# Patient Record
Sex: FEMALE | Race: OTHER | HISPANIC OR LATINO | Employment: UNEMPLOYED | URBAN - METROPOLITAN AREA
[De-identification: names, ages, dates, MRNs, and addresses within clinical notes are randomized per-mention and may not be internally consistent; named-entity substitution may affect disease eponyms.]

---

## 2021-06-16 ENCOUNTER — HOSPITAL ENCOUNTER (EMERGENCY)
Facility: HOSPITAL | Age: 36
Discharge: HOME | End: 2021-06-16
Attending: EMERGENCY MEDICINE

## 2021-06-16 VITALS
HEART RATE: 68 BPM | TEMPERATURE: 97.8 F | SYSTOLIC BLOOD PRESSURE: 140 MMHG | RESPIRATION RATE: 18 BRPM | DIASTOLIC BLOOD PRESSURE: 60 MMHG | OXYGEN SATURATION: 95 %

## 2021-06-16 DIAGNOSIS — N39.0 UTI (URINARY TRACT INFECTION): ICD-10-CM

## 2021-06-16 DIAGNOSIS — F12.188 CANNABIS HYPEREMESIS SYNDROME CONCURRENT WITH AND DUE TO CANNABIS ABUSE (HCC): ICD-10-CM

## 2021-06-16 DIAGNOSIS — R11.2 NAUSEA AND VOMITING: Primary | ICD-10-CM

## 2021-06-16 DIAGNOSIS — K52.9 GASTROENTERITIS: ICD-10-CM

## 2021-06-16 LAB
ALBUMIN SERPL BCP-MCNC: 3.9 G/DL (ref 3.5–5)
ALP SERPL-CCNC: 73 U/L (ref 46–116)
ALT SERPL W P-5'-P-CCNC: 41 U/L (ref 12–78)
ANION GAP SERPL CALCULATED.3IONS-SCNC: 11 MMOL/L (ref 4–13)
AST SERPL W P-5'-P-CCNC: 31 U/L (ref 5–45)
BACTERIA UR QL AUTO: ABNORMAL /HPF
BASOPHILS # BLD AUTO: 0.06 THOUSANDS/ΜL (ref 0–0.1)
BASOPHILS NFR BLD AUTO: 1 % (ref 0–1)
BILIRUB SERPL-MCNC: 0.44 MG/DL (ref 0.2–1)
BILIRUB UR QL STRIP: NEGATIVE
BUN SERPL-MCNC: 10 MG/DL (ref 5–25)
CALCIUM SERPL-MCNC: 9.1 MG/DL (ref 8.3–10.1)
CHLORIDE SERPL-SCNC: 106 MMOL/L (ref 100–108)
CLARITY UR: CLEAR
CO2 SERPL-SCNC: 26 MMOL/L (ref 21–32)
COLOR UR: YELLOW
CREAT SERPL-MCNC: 0.83 MG/DL (ref 0.6–1.3)
EOSINOPHIL # BLD AUTO: 0.02 THOUSAND/ΜL (ref 0–0.61)
EOSINOPHIL NFR BLD AUTO: 0 % (ref 0–6)
ERYTHROCYTE [DISTWIDTH] IN BLOOD BY AUTOMATED COUNT: 11.9 % (ref 11.6–15.1)
EXT PREG TEST URINE: NEGATIVE
EXT. CONTROL ED NAV: NORMAL
GFR SERPL CREATININE-BSD FRML MDRD: 92 ML/MIN/1.73SQ M
GLUCOSE SERPL-MCNC: 139 MG/DL (ref 65–140)
GLUCOSE UR STRIP-MCNC: NEGATIVE MG/DL
HCT VFR BLD AUTO: 42.3 % (ref 34.8–46.1)
HGB BLD-MCNC: 14.1 G/DL (ref 11.5–15.4)
HGB UR QL STRIP.AUTO: ABNORMAL
IMM GRANULOCYTES # BLD AUTO: 0.02 THOUSAND/UL (ref 0–0.2)
IMM GRANULOCYTES NFR BLD AUTO: 0 % (ref 0–2)
KETONES UR STRIP-MCNC: ABNORMAL MG/DL
LEUKOCYTE ESTERASE UR QL STRIP: ABNORMAL
LIPASE SERPL-CCNC: 76 U/L (ref 73–393)
LYMPHOCYTES # BLD AUTO: 1.06 THOUSANDS/ΜL (ref 0.6–4.47)
LYMPHOCYTES NFR BLD AUTO: 13 % (ref 14–44)
MCH RBC QN AUTO: 32.6 PG (ref 26.8–34.3)
MCHC RBC AUTO-ENTMCNC: 33.3 G/DL (ref 31.4–37.4)
MCV RBC AUTO: 98 FL (ref 82–98)
MONOCYTES # BLD AUTO: 0.22 THOUSAND/ΜL (ref 0.17–1.22)
MONOCYTES NFR BLD AUTO: 3 % (ref 4–12)
NEUTROPHILS # BLD AUTO: 6.66 THOUSANDS/ΜL (ref 1.85–7.62)
NEUTS SEG NFR BLD AUTO: 83 % (ref 43–75)
NITRITE UR QL STRIP: POSITIVE
NON-SQ EPI CELLS URNS QL MICRO: ABNORMAL /HPF
NRBC BLD AUTO-RTO: 0 /100 WBCS
PH UR STRIP.AUTO: >=9 [PH] (ref 4.5–8)
PLATELET # BLD AUTO: 322 THOUSANDS/UL (ref 149–390)
PMV BLD AUTO: 9.8 FL (ref 8.9–12.7)
POTASSIUM SERPL-SCNC: 3.6 MMOL/L (ref 3.5–5.3)
PROT SERPL-MCNC: 8.1 G/DL (ref 6.4–8.2)
PROT UR STRIP-MCNC: ABNORMAL MG/DL
RBC # BLD AUTO: 4.33 MILLION/UL (ref 3.81–5.12)
RBC #/AREA URNS AUTO: ABNORMAL /HPF
SODIUM SERPL-SCNC: 143 MMOL/L (ref 136–145)
SP GR UR STRIP.AUTO: 1.02 (ref 1–1.03)
UROBILINOGEN UR QL STRIP.AUTO: 0.2 E.U./DL
WBC # BLD AUTO: 8.04 THOUSAND/UL (ref 4.31–10.16)
WBC #/AREA URNS AUTO: ABNORMAL /HPF

## 2021-06-16 PROCEDURE — 96375 TX/PRO/DX INJ NEW DRUG ADDON: CPT

## 2021-06-16 PROCEDURE — 87086 URINE CULTURE/COLONY COUNT: CPT

## 2021-06-16 PROCEDURE — 99285 EMERGENCY DEPT VISIT HI MDM: CPT | Performed by: PHYSICIAN ASSISTANT

## 2021-06-16 PROCEDURE — 99283 EMERGENCY DEPT VISIT LOW MDM: CPT

## 2021-06-16 PROCEDURE — 83690 ASSAY OF LIPASE: CPT

## 2021-06-16 PROCEDURE — 96376 TX/PRO/DX INJ SAME DRUG ADON: CPT

## 2021-06-16 PROCEDURE — 81025 URINE PREGNANCY TEST: CPT | Performed by: PHYSICIAN ASSISTANT

## 2021-06-16 PROCEDURE — 96374 THER/PROPH/DIAG INJ IV PUSH: CPT

## 2021-06-16 PROCEDURE — 85025 COMPLETE CBC W/AUTO DIFF WBC: CPT

## 2021-06-16 PROCEDURE — 36415 COLL VENOUS BLD VENIPUNCTURE: CPT

## 2021-06-16 PROCEDURE — 87186 SC STD MICRODIL/AGAR DIL: CPT

## 2021-06-16 PROCEDURE — 96372 THER/PROPH/DIAG INJ SC/IM: CPT

## 2021-06-16 PROCEDURE — 80053 COMPREHEN METABOLIC PANEL: CPT

## 2021-06-16 PROCEDURE — 81001 URINALYSIS AUTO W/SCOPE: CPT

## 2021-06-16 PROCEDURE — 96361 HYDRATE IV INFUSION ADD-ON: CPT

## 2021-06-16 RX ORDER — HALOPERIDOL 5 MG/ML
5 INJECTION INTRAMUSCULAR ONCE
Status: COMPLETED | OUTPATIENT
Start: 2021-06-16 | End: 2021-06-16

## 2021-06-16 RX ORDER — KETOROLAC TROMETHAMINE 30 MG/ML
15 INJECTION, SOLUTION INTRAMUSCULAR; INTRAVENOUS ONCE
Status: COMPLETED | OUTPATIENT
Start: 2021-06-16 | End: 2021-06-16

## 2021-06-16 RX ORDER — ONDANSETRON 4 MG/1
4 TABLET, FILM COATED ORAL EVERY 6 HOURS
Qty: 12 TABLET | Refills: 0 | Status: SHIPPED | OUTPATIENT
Start: 2021-06-16 | End: 2022-07-28 | Stop reason: ALTCHOICE

## 2021-06-16 RX ORDER — ONDANSETRON 2 MG/ML
4 INJECTION INTRAMUSCULAR; INTRAVENOUS ONCE
Status: COMPLETED | OUTPATIENT
Start: 2021-06-16 | End: 2021-06-16

## 2021-06-16 RX ORDER — CEPHALEXIN 500 MG/1
500 CAPSULE ORAL 4 TIMES DAILY
Qty: 28 CAPSULE | Refills: 0 | Status: SHIPPED | OUTPATIENT
Start: 2021-06-16 | End: 2021-06-23

## 2021-06-16 RX ADMIN — SODIUM CHLORIDE 1000 ML: 0.9 INJECTION, SOLUTION INTRAVENOUS at 17:51

## 2021-06-16 RX ADMIN — KETOROLAC TROMETHAMINE 15 MG: 30 INJECTION, SOLUTION INTRAMUSCULAR; INTRAVENOUS at 18:42

## 2021-06-16 RX ADMIN — HALOPERIDOL LACTATE 5 MG: 5 INJECTION, SOLUTION INTRAMUSCULAR at 18:42

## 2021-06-16 RX ADMIN — ONDANSETRON 4 MG: 2 INJECTION INTRAMUSCULAR; INTRAVENOUS at 19:56

## 2021-06-16 RX ADMIN — ONDANSETRON 4 MG: 2 INJECTION INTRAMUSCULAR; INTRAVENOUS at 17:51

## 2021-06-16 NOTE — ED NOTES
Pt moving around during triage unable to sit still during blood pressure cuff reading  Unable to obtain temperature at this time        Zoë Anand, RN  06/16/21 9098

## 2021-06-16 NOTE — ED NOTES
Per pt request, pt given water and ginger ale at this time     Kaushik Sheriff, CARMELITA  06/16/21 1924

## 2021-06-16 NOTE — ED PROVIDER NOTES
History  Chief Complaint   Patient presents with    Vomiting     Pt reports vomiting since yesterday, unable to keep anything down  reports abd pain as well     Pt presents to the ED with abodminal pain and vomiting today  Pt states pain started yesterday but has vomited 20x today  + fevers and chills at home  Used marajuana yesterday and then symptoms started  Similar symptoms a month ago - seen at [facility] - feels same - had labs and CT done then - admitted for electrolyte abnormality -           None       History reviewed  No pertinent past medical history  History reviewed  No pertinent surgical history  History reviewed  No pertinent family history  I have reviewed and agree with the history as documented  E-Cigarette/Vaping    E-Cigarette Use Never User      E-Cigarette/Vaping Substances    Nicotine No     THC No     CBD No     Flavoring No     Other No     Unknown No      Social History     Tobacco Use    Smoking status: Never Smoker    Smokeless tobacco: Never Used   Vaping Use    Vaping Use: Never used   Substance Use Topics    Alcohol use: Never    Drug use: Never       Review of Systems   All other systems reviewed and are negative  Physical Exam  Physical Exam  Vitals and nursing note reviewed  Constitutional:       Appearance: She is well-developed  HENT:      Head: Normocephalic and atraumatic  Right Ear: External ear normal       Left Ear: External ear normal    Eyes:      Conjunctiva/sclera: Conjunctivae normal    Cardiovascular:      Rate and Rhythm: Normal rate and regular rhythm  Heart sounds: Normal heart sounds  Pulmonary:      Effort: Pulmonary effort is normal       Breath sounds: Normal breath sounds  Abdominal:      General: Bowel sounds are normal       Palpations: Abdomen is soft  Tenderness: There is abdominal tenderness  There is no right CVA tenderness or left CVA tenderness        Comments: Diffuse abdominal tenderness Musculoskeletal:         General: Normal range of motion  Cervical back: Normal range of motion and neck supple  Skin:     General: Skin is warm  Findings: No rash  Neurological:      Mental Status: She is alert  Psychiatric:         Behavior: Behavior normal       Comments: Anxious with symptoms and upset         Vital Signs  ED Triage Vitals   Temperature Pulse Respirations Blood Pressure SpO2   06/16/21 1923 06/16/21 1737 06/16/21 1843 06/16/21 1843 06/16/21 1737   97 8 °F (36 6 °C) 60 20 145/69 92 %      Temp Source Heart Rate Source Patient Position - Orthostatic VS BP Location FiO2 (%)   06/16/21 1923 06/16/21 1923 06/16/21 1923 06/16/21 1923 --   Oral Monitor Lying Left arm       Pain Score       06/16/21 1737       Worst Possible Pain           Vitals:    06/16/21 1737 06/16/21 1843 06/16/21 1923   BP:  145/69 140/60   Pulse: 60 78 68   Patient Position - Orthostatic VS:   Lying         Visual Acuity      ED Medications  Medications   ondansetron (ZOFRAN) injection 4 mg (4 mg Intravenous Given 6/16/21 1751)   sodium chloride 0 9 % bolus 1,000 mL (0 mL Intravenous Stopped 6/16/21 1942)   ketorolac (TORADOL) injection 15 mg (15 mg Intravenous Given 6/16/21 1842)   haloperidol lactate (HALDOL) injection 5 mg (5 mg Intramuscular Given 6/16/21 1842)   ondansetron (ZOFRAN) injection 4 mg (4 mg Intravenous Given 6/16/21 1956)       Diagnostic Studies  Results Reviewed     Procedure Component Value Units Date/Time    Urine Microscopic [[id number]]  (Abnormal) Collected: 06/16/21 1833    Lab Status: Final result Specimen: Urine, Other Updated: 06/16/21 1906     RBC, UA 2-4 /hpf      WBC, UA Innumerable /hpf      Epithelial Cells Occasional /hpf      Bacteria, UA Innumerable /hpf     Urine culture [[id number]] Collected: 06/16/21 1833    Lab Status:  In process Specimen: Urine, Other Updated: 06/16/21 1906    POCT urinalysis dipstick [[id number]]  (Abnormal) Resulted: 06/16/21 1835    Lab Status: Final result Specimen: Urine Updated: 06/16/21 1835    POCT pregnancy, urine [[id number]]  (Normal) Resulted: 06/16/21 1835    Lab Status: Final result Updated: 06/16/21 1835     EXT PREG TEST UR (Ref: Negative) negative     Control valid    Urine Macroscopic, POC [[id number]]  (Abnormal) Collected: 06/16/21 1833    Lab Status: Final result Specimen: Urine Updated: 06/16/21 1834     Color, UA Yellow     Clarity, UA Clear     pH, UA >=9 0     Leukocytes, UA Small     Nitrite, UA Positive     Protein,  (2+) mg/dl      Glucose, UA Negative mg/dl      Ketones, UA 15 (1+) mg/dl      Urobilinogen, UA 0 2 E U /dl      Bilirubin, UA Negative     Blood, UA Trace     Specific Shiro, UA 1 020    Narrative:      CLINITEK RESULT    Lipase [[id number]]  (Normal) Collected: 06/16/21 1752    Lab Status: Final result Specimen: Blood from Arm, Left Updated: 06/16/21 1810     Lipase 76 u/L     Comprehensive metabolic panel [[id number]] Collected: 06/16/21 1752    Lab Status: Final result Specimen: Blood from Arm, Left Updated: 06/16/21 1810     Sodium 143 mmol/L      Potassium 3 6 mmol/L      Chloride 106 mmol/L      CO2 26 mmol/L      ANION GAP 11 mmol/L      BUN 10 mg/dL      Creatinine 0 83 mg/dL      Glucose 139 mg/dL      Calcium 9 1 mg/dL      AST 31 U/L      ALT 41 U/L      Alkaline Phosphatase 73 U/L      Total Protein 8 1 g/dL      Albumin 3 9 g/dL      Total Bilirubin 0 44 mg/dL      eGFR 92 ml/min/1 73sq m     Narrative:      [place] guidelines for Chronic Kidney Disease (CKD):     Stage 1 with normal or high GFR (GFR > 90 mL/min/1 73 square meters)    Stage 2 Mild CKD (GFR = 60-89 mL/min/1 73 square meters)    Stage 3A Moderate CKD (GFR = 45-59 mL/min/1 73 square meters)    Stage 3B Moderate CKD (GFR = 30-44 mL/min/1 73 square meters)    Stage 4 Severe CKD (GFR = 15-29 mL/min/1 73 square meters)    Stage 5 End Stage CKD (GFR <15 mL/min/1 73 square meters)  Note: GFR calculation is accurate only with a steady state creatinine    CBC and differential [[id number]]  (Abnormal) Collected: 06/16/21 1752    Lab Status: Final result Specimen: Blood from Arm, Left Updated: 06/16/21 1756     WBC 8 04 Thousand/uL      RBC 4 33 Million/uL      Hemoglobin 14 1 g/dL      Hematocrit 42 3 %      MCV 98 fL      MCH 32 6 pg      MCHC 33 3 g/dL      RDW 11 9 %      MPV 9 8 fL      Platelets 879 Thousands/uL      nRBC 0 /100 WBCs      Neutrophils Relative 83 %      Immat GRANS % 0 %      Lymphocytes Relative 13 %      Monocytes Relative 3 %      Eosinophils Relative 0 %      Basophils Relative 1 %      Neutrophils Absolute 6 66 Thousands/µL      Immature Grans Absolute 0 02 Thousand/uL      Lymphocytes Absolute 1 06 Thousands/µL      Monocytes Absolute 0 22 Thousand/µL      Eosinophils Absolute 0 02 Thousand/µL      Basophils Absolute 0 06 Thousands/µL                  No orders to display              Procedures  Procedures         ED Course  ED Course as of Jun 16 2350   Wed Jun 16, 2021 1910 Abdomen is soft and non tender and pt resting comfortably - symptoms improved  Asking for water  Discussed labs and urine will tx UTI      1953 Doing well with PO challenge pt states she still feels nauseated but as she says this is drinking water and gingerale and no vomiting will give additional zofran prior to dc  Pt resting comfortably                                               MDM  Number of Diagnoses or Management Options  Cannabis hyperemesis syndrome concurrent with and due to cannabis abuse ([facility] [address] ): new and requires workup  Gastroenteritis: new and requires workup  Nausea and vomiting: new and requires workup  UTI (urinary tract infection): new and requires workup  Diagnosis management comments: Records from [facility] reviewed - from recent visit for similar symptoms - discussed w pt - offered CT pt declines          Amount and/or Complexity of Data Reviewed  Clinical lab tests: reviewed  Decide to obtain previous medical records or to obtain history from someone other than the patient: yes  Review and summarize past medical records: yes    Patient Progress  Patient progress: improved      Disposition  Final diagnoses:   Nausea and vomiting   Gastroenteritis   Cannabis hyperemesis syndrome concurrent with and due to cannabis abuse ([facility] )   UTI (urinary tract infection)     Time reflects when diagnosis was documented in both MDM as applicable and the Disposition within this note     Time User Action Codes Description Comment    6/16/2021  7:12 PM Antionette Lu [R11 2] Nausea and vomiting     6/16/2021  7:12 PM Antionette Lu [K52 9] Gastroenteritis     6/16/2021  7:13 PM Antionette Lu [F12 188] Cannabis hyperemesis syndrome concurrent with and due to cannabis abuse ([facility] )     6/16/2021  7:13 PM Antionette Lu [N39 0] UTI (urinary tract infection)       ED Disposition     ED Disposition Condition Date/Time Comment    Discharge Stable Wed Jun 16, 2021  7:12 PM Carola Saenz discharge to home/self care  Follow-up Information     Follow up With Specialties Details Why Contact Info    [employer]    [phone]            Discharge Medication List as of 6/16/2021  7:16 PM      START taking these medications    Details   cephalexin (KEFLEX) 500 mg capsule Take 1 capsule (500 mg total) by mouth 4 (four) times a day for 7 days, Starting Wed 6/16/2021, Until Wed 6/23/2021, Normal      ondansetron (ZOFRAN) 4 mg tablet Take 1 tablet (4 mg total) by mouth every 6 (six) hours, Starting Wed 6/16/2021, Normal           No discharge procedures on file      PDMP Review     None          ED Provider  Electronically Signed by           Alisa Schreiber PA-C  06/16/21 4255

## 2021-06-17 NOTE — ED NOTES
While medicating pt, pt dry heaving, stating "I know the doctor wants me to go home, but I don't feel good, I need to stay " RN explained to pt, that the provider prescribed her medications to help with the nausea and vomiting at home  Pt states "I just don't feel well and want to stay" Pt then rolls over and goes back to sleep  Provider aware       Annalee Tinoco RN  06/16/21 2004

## 2021-06-18 LAB — BACTERIA UR CULT: ABNORMAL

## 2021-08-09 ENCOUNTER — HOSPITAL ENCOUNTER (EMERGENCY)
Facility: HOSPITAL | Age: 36
Discharge: HOME | End: 2021-08-09
Attending: EMERGENCY MEDICINE | Admitting: EMERGENCY MEDICINE

## 2021-08-09 VITALS
HEART RATE: 91 BPM | SYSTOLIC BLOOD PRESSURE: 108 MMHG | TEMPERATURE: 98.2 F | OXYGEN SATURATION: 98 % | DIASTOLIC BLOOD PRESSURE: 76 MMHG | RESPIRATION RATE: 18 BRPM

## 2021-08-09 DIAGNOSIS — H66.92 LEFT OTITIS MEDIA: Primary | ICD-10-CM

## 2021-08-09 DIAGNOSIS — H60.502 ACUTE OTITIS EXTERNA OF LEFT EAR, UNSPECIFIED TYPE: ICD-10-CM

## 2021-08-09 PROCEDURE — 99284 EMERGENCY DEPT VISIT MOD MDM: CPT | Performed by: EMERGENCY MEDICINE

## 2021-08-09 PROCEDURE — 99282 EMERGENCY DEPT VISIT SF MDM: CPT

## 2021-08-09 RX ORDER — AMOXICILLIN 500 MG/1
500 CAPSULE ORAL 3 TIMES DAILY
Qty: 30 CAPSULE | Refills: 0 | Status: SHIPPED | OUTPATIENT
Start: 2021-08-09 | End: 2021-08-19

## 2021-08-09 RX ORDER — OFLOXACIN 3 MG/ML
10 SOLUTION AURICULAR (OTIC) DAILY
Qty: 5 ML | Refills: 0 | Status: SHIPPED | OUTPATIENT
Start: 2021-08-09 | End: 2021-08-16

## 2021-08-09 RX ORDER — AMOXICILLIN 250 MG/1
500 CAPSULE ORAL ONCE
Status: COMPLETED | OUTPATIENT
Start: 2021-08-09 | End: 2021-08-09

## 2021-08-09 RX ADMIN — AMOXICILLIN 500 MG: 250 CAPSULE ORAL at 22:02

## 2021-08-12 NOTE — ED PROVIDER NOTES
History  Chief Complaint   Patient presents with    Earache     left sided earache ongoing for a week  states having some yellow and pink drainage from the ear     This is a 80-year-old female who presents with left-sided earache and drainage for the past week  Denies any recent swimming  Patient states that she has been accidentally getting water in her years from the shower  Denies fever/chills, nausea/vomiting, URI symptoms, neck pain, chest pain, palpitations, shortness of breath, cough, neck pain, back pain, flank pain, abdominal pain, diarrhea, hematochezia, dysuria, hematuria, abnormal vaginal discharge/bleeding  Prior to Admission Medications   Prescriptions Last Dose Informant Patient Reported? Taking?   ondansetron (ZOFRAN) 4 mg tablet   No No   Sig: Take 1 tablet (4 mg total) by mouth every 6 (six) hours      Facility-Administered Medications: None       History reviewed  No pertinent past medical history  History reviewed  No pertinent surgical history  History reviewed  No pertinent family history  I have reviewed and agree with the history as documented  E-Cigarette/Vaping    E-Cigarette Use Never User      E-Cigarette/Vaping Substances    Nicotine No     THC No     CBD No     Flavoring No     Other No     Unknown No      Social History     Tobacco Use    Smoking status: Never Smoker    Smokeless tobacco: Never Used   Vaping Use    Vaping Use: Never used   Substance Use Topics    Alcohol use: Never    Drug use: Never       Review of Systems   Constitutional: Negative for chills and fever  HENT: Positive for ear discharge and ear pain  Negative for congestion, rhinorrhea, sore throat and trouble swallowing  Respiratory: Negative for cough, chest tightness, shortness of breath and wheezing  Cardiovascular: Negative for chest pain and palpitations  Gastrointestinal: Negative for abdominal pain, blood in stool, diarrhea, nausea and vomiting     Musculoskeletal: Negative for back pain and neck pain  All other systems reviewed and are negative  Physical Exam  Physical Exam  Constitutional:       Appearance: Normal appearance  She is well-developed  She is not ill-appearing or toxic-appearing  HENT:      Head: Normocephalic  Right Ear: Hearing, tympanic membrane, ear canal and external ear normal  No middle ear effusion  Left Ear: Hearing and external ear normal  Drainage present  Ears:      Comments: Yellow drainage from the left ear  Swollen and irritated ear canal   Unable to fully visualize left tympanic membrane  Mouth/Throat:      Pharynx: Uvula midline  Tonsils: No tonsillar exudate  Eyes:      General: Lids are normal       Conjunctiva/sclera: Conjunctivae normal       Pupils: Pupils are equal, round, and reactive to light  Cardiovascular:      Rate and Rhythm: Normal rate and regular rhythm  Pulmonary:      Effort: Pulmonary effort is normal  No tachypnea  Abdominal:      General: There is no distension  Palpations: Abdomen is soft  Abdomen is not rigid  Neurological:      Mental Status: She is alert  Psychiatric:         Speech: Speech normal          Behavior: Behavior normal  Behavior is cooperative           Vital Signs  ED Triage Vitals [08/09/21 2133]   Temperature Pulse Respirations Blood Pressure SpO2   98 2 °F (36 8 °C) 91 18 108/76 98 %      Temp Source Heart Rate Source Patient Position - Orthostatic VS BP Location FiO2 (%)   Oral Monitor Sitting Left arm --      Pain Score       --           Vitals:    08/09/21 2133   BP: 108/76   Pulse: 91   Patient Position - Orthostatic VS: Sitting         Visual Acuity      ED Medications  Medications   amoxicillin (AMOXIL) capsule 500 mg (500 mg Oral Given 8/9/21 2202)       Diagnostic Studies  Results Reviewed     None                 No orders to display              Procedures  Procedures         ED Course                                           MDM  Number of Diagnoses or Management Options  Acute otitis externa of left ear, unspecified type  Left otitis media  Diagnosis management comments: Will treat for otitis externa and otitis media  Follow up with family doctor  Strict return precautions given  Disposition  Final diagnoses:   Left otitis media   Acute otitis externa of left ear, unspecified type     Time reflects when diagnosis was documented in both MDM as applicable and the Disposition within this note     Time User Action Codes Description Comment    8/9/2021  9:55 PM Wing Durbin Add [Q90 07] Left otitis media     8/9/2021  9:56 PM Wing Durbin Add [H60 502] Acute otitis externa of left ear, unspecified type       ED Disposition     ED Disposition Condition Date/Time Comment    Discharge Stable Mon Aug 9, 2021  9:55 PM Angelina Griggs discharge to home/self care  Follow-up Information     Follow up With Specialties Details Why [address] [facility] Emergency Department Emergency Medicine Go to  If symptoms worsen [place] [id number]  [address] [facility] Emergency Department, [address] [facility] , [facility], [address] [facility], [address]          Discharge Medication List as of 8/9/2021  9:58 PM      START taking these medications    Details   amoxicillin (AMOXIL) 500 mg capsule Take 1 capsule (500 mg total) by mouth 3 (three) times a day for 10 days, Starting Mon 8/9/2021, Until u 8/19/2021, Normal      ofloxacin (FLOXIN) 0 3 % otic solution Administer 10 drops into the left ear daily for 7 days, Starting Mon 8/9/2021, Until Mon 8/16/2021, Normal         CONTINUE these medications which have NOT CHANGED    Details   ondansetron (ZOFRAN) 4 mg tablet Take 1 tablet (4 mg total) by mouth every 6 (six) hours, Starting Wed 6/16/2021, Normal           No discharge procedures on file      PDMP Review     None          ED Provider  Electronically Signed by Marian Robbins MD  08/12/21 9059

## 2021-10-13 ENCOUNTER — HOSPITAL ENCOUNTER (EMERGENCY)
Facility: HOSPITAL | Age: 36
Discharge: HOME | End: 2021-10-13
Payer: COMMERCIAL

## 2021-10-13 ENCOUNTER — APPOINTMENT (EMERGENCY)
Dept: ULTRASOUND IMAGING | Facility: HOSPITAL | Age: 36
End: 2021-10-13
Payer: COMMERCIAL

## 2021-10-13 VITALS
TEMPERATURE: 98.1 F | HEART RATE: 64 BPM | RESPIRATION RATE: 20 BRPM | WEIGHT: 150 LBS | SYSTOLIC BLOOD PRESSURE: 133 MMHG | OXYGEN SATURATION: 100 % | DIASTOLIC BLOOD PRESSURE: 72 MMHG | BODY MASS INDEX: 24.11 KG/M2 | HEIGHT: 66 IN

## 2021-10-13 DIAGNOSIS — R11.2 NAUSEA AND VOMITING: ICD-10-CM

## 2021-10-13 DIAGNOSIS — N39.0 UTI (URINARY TRACT INFECTION): ICD-10-CM

## 2021-10-13 DIAGNOSIS — R10.84 GENERALIZED ABDOMINAL PAIN: Primary | ICD-10-CM

## 2021-10-13 LAB
ALBUMIN SERPL BCP-MCNC: 4.4 G/DL (ref 3.4–4.8)
ALP SERPL-CCNC: 39.6 U/L (ref 35–140)
ALT SERPL W P-5'-P-CCNC: 9 U/L (ref 5–54)
ANION GAP SERPL CALCULATED.3IONS-SCNC: 9 MMOL/L (ref 4–13)
AST SERPL W P-5'-P-CCNC: 19 U/L (ref 15–41)
B-HCG SERPL-ACNC: 3560 MIU/ML
BACTERIA UR QL AUTO: ABNORMAL /HPF
BASOPHILS # BLD AUTO: 0.01 THOUSANDS/ΜL (ref 0–0.1)
BASOPHILS NFR BLD AUTO: 0 % (ref 0–1)
BILIRUB SERPL-MCNC: 0.28 MG/DL (ref 0.3–1.2)
BILIRUB UR QL STRIP: NEGATIVE
BUN SERPL-MCNC: 8 MG/DL (ref 6–20)
CALCIUM SERPL-MCNC: 9.5 MG/DL (ref 8.4–10.2)
CHLORIDE SERPL-SCNC: 107 MMOL/L (ref 96–108)
CLARITY UR: ABNORMAL
CO2 SERPL-SCNC: 27 MMOL/L (ref 22–33)
COLOR UR: YELLOW
CREAT SERPL-MCNC: 0.64 MG/DL (ref 0.4–1.1)
EOSINOPHIL # BLD AUTO: 0.07 THOUSAND/ΜL (ref 0–0.61)
EOSINOPHIL NFR BLD AUTO: 2 % (ref 0–6)
ERYTHROCYTE [DISTWIDTH] IN BLOOD BY AUTOMATED COUNT: 12.2 % (ref 11.6–15.1)
GFR SERPL CREATININE-BSD FRML MDRD: 116 ML/MIN/1.73SQ M
GLUCOSE SERPL-MCNC: 103 MG/DL (ref 65–140)
GLUCOSE UR STRIP-MCNC: NEGATIVE MG/DL
HCT VFR BLD AUTO: 41.6 % (ref 34.8–46.1)
HGB BLD-MCNC: 14.2 G/DL (ref 11.5–15.4)
HGB UR QL STRIP.AUTO: ABNORMAL
IMM GRANULOCYTES # BLD AUTO: 0 THOUSAND/UL (ref 0–0.2)
IMM GRANULOCYTES NFR BLD AUTO: 0 % (ref 0–2)
KETONES UR STRIP-MCNC: ABNORMAL MG/DL
LACTATE SERPL-SCNC: 1.3 MMOL/L (ref 0–2)
LEUKOCYTE ESTERASE UR QL STRIP: ABNORMAL
LIPASE SERPL-CCNC: 63 U/L (ref 13–60)
LYMPHOCYTES # BLD AUTO: 1.76 THOUSANDS/ΜL (ref 0.6–4.47)
LYMPHOCYTES NFR BLD AUTO: 37 % (ref 14–44)
MCH RBC QN AUTO: 31.9 PG (ref 26.8–34.3)
MCHC RBC AUTO-ENTMCNC: 34.1 G/DL (ref 31.4–37.4)
MCV RBC AUTO: 94 FL (ref 82–98)
MONOCYTES # BLD AUTO: 0.46 THOUSAND/ΜL (ref 0.17–1.22)
MONOCYTES NFR BLD AUTO: 10 % (ref 4–12)
NEUTROPHILS # BLD AUTO: 2.52 THOUSANDS/ΜL (ref 1.85–7.62)
NEUTS SEG NFR BLD AUTO: 51 % (ref 43–75)
NITRITE UR QL STRIP: POSITIVE
NON-SQ EPI CELLS URNS QL MICRO: ABNORMAL /HPF
PH UR STRIP.AUTO: 6 [PH]
PLATELET # BLD AUTO: 236 THOUSANDS/UL (ref 149–390)
PMV BLD AUTO: 10.4 FL (ref 8.9–12.7)
POTASSIUM SERPL-SCNC: 4.6 MMOL/L (ref 3.5–5)
PROT SERPL-MCNC: 7.8 G/DL (ref 6.4–8.3)
PROT UR STRIP-MCNC: NEGATIVE MG/DL
RBC # BLD AUTO: 4.45 MILLION/UL (ref 3.81–5.12)
RBC #/AREA URNS AUTO: ABNORMAL /HPF
SODIUM SERPL-SCNC: 143 MMOL/L (ref 133–145)
SP GR UR STRIP.AUTO: 1.02 (ref 1–1.03)
UROBILINOGEN UR QL STRIP.AUTO: 0.2 E.U./DL
WBC # BLD AUTO: 4.82 THOUSAND/UL (ref 4.31–10.16)
WBC #/AREA URNS AUTO: ABNORMAL /HPF

## 2021-10-13 PROCEDURE — 80053 COMPREHEN METABOLIC PANEL: CPT | Performed by: PHYSICIAN ASSISTANT

## 2021-10-13 PROCEDURE — 99284 EMERGENCY DEPT VISIT MOD MDM: CPT

## 2021-10-13 PROCEDURE — 81001 URINALYSIS AUTO W/SCOPE: CPT | Performed by: PHYSICIAN ASSISTANT

## 2021-10-13 PROCEDURE — 96375 TX/PRO/DX INJ NEW DRUG ADDON: CPT

## 2021-10-13 PROCEDURE — 83690 ASSAY OF LIPASE: CPT | Performed by: PHYSICIAN ASSISTANT

## 2021-10-13 PROCEDURE — 87077 CULTURE AEROBIC IDENTIFY: CPT | Performed by: PHYSICIAN ASSISTANT

## 2021-10-13 PROCEDURE — 87086 URINE CULTURE/COLONY COUNT: CPT | Performed by: PHYSICIAN ASSISTANT

## 2021-10-13 PROCEDURE — 84702 CHORIONIC GONADOTROPIN TEST: CPT | Performed by: PHYSICIAN ASSISTANT

## 2021-10-13 PROCEDURE — 96365 THER/PROPH/DIAG IV INF INIT: CPT

## 2021-10-13 PROCEDURE — 96361 HYDRATE IV INFUSION ADD-ON: CPT

## 2021-10-13 PROCEDURE — 36415 COLL VENOUS BLD VENIPUNCTURE: CPT | Performed by: PHYSICIAN ASSISTANT

## 2021-10-13 PROCEDURE — 96376 TX/PRO/DX INJ SAME DRUG ADON: CPT

## 2021-10-13 PROCEDURE — 85025 COMPLETE CBC W/AUTO DIFF WBC: CPT | Performed by: PHYSICIAN ASSISTANT

## 2021-10-13 PROCEDURE — 87186 SC STD MICRODIL/AGAR DIL: CPT | Performed by: PHYSICIAN ASSISTANT

## 2021-10-13 PROCEDURE — 83605 ASSAY OF LACTIC ACID: CPT | Performed by: PHYSICIAN ASSISTANT

## 2021-10-13 PROCEDURE — 99284 EMERGENCY DEPT VISIT MOD MDM: CPT | Performed by: PHYSICIAN ASSISTANT

## 2021-10-13 PROCEDURE — 96372 THER/PROPH/DIAG INJ SC/IM: CPT

## 2021-10-13 PROCEDURE — 76815 OB US LIMITED FETUS(S): CPT

## 2021-10-13 RX ORDER — CEFTRIAXONE 1 G/50ML
1000 INJECTION, SOLUTION INTRAVENOUS ONCE
Status: COMPLETED | OUTPATIENT
Start: 2021-10-13 | End: 2021-10-13

## 2021-10-13 RX ORDER — ONDANSETRON 4 MG/1
4 TABLET, ORALLY DISINTEGRATING ORAL EVERY 6 HOURS PRN
Qty: 12 TABLET | Refills: 0 | Status: SHIPPED | OUTPATIENT
Start: 2021-10-13 | End: 2022-07-28 | Stop reason: ALTCHOICE

## 2021-10-13 RX ORDER — HALOPERIDOL 5 MG/ML
2 INJECTION INTRAMUSCULAR ONCE
Status: DISCONTINUED | OUTPATIENT
Start: 2021-10-13 | End: 2021-10-13

## 2021-10-13 RX ORDER — HALOPERIDOL 5 MG/ML
2 INJECTION INTRAMUSCULAR ONCE
Status: COMPLETED | OUTPATIENT
Start: 2021-10-13 | End: 2021-10-13

## 2021-10-13 RX ORDER — ONDANSETRON 2 MG/ML
4 INJECTION INTRAMUSCULAR; INTRAVENOUS ONCE
Status: COMPLETED | OUTPATIENT
Start: 2021-10-13 | End: 2021-10-13

## 2021-10-13 RX ORDER — KETOROLAC TROMETHAMINE 30 MG/ML
15 INJECTION, SOLUTION INTRAMUSCULAR; INTRAVENOUS ONCE
Status: COMPLETED | OUTPATIENT
Start: 2021-10-13 | End: 2021-10-13

## 2021-10-13 RX ORDER — ACETAMINOPHEN 325 MG/1
650 TABLET ORAL ONCE
Status: DISCONTINUED | OUTPATIENT
Start: 2021-10-13 | End: 2021-10-13 | Stop reason: HOSPADM

## 2021-10-13 RX ORDER — CEPHALEXIN 500 MG/1
500 CAPSULE ORAL 3 TIMES DAILY
Qty: 21 CAPSULE | Refills: 0 | Status: SHIPPED | OUTPATIENT
Start: 2021-10-13 | End: 2021-10-20

## 2021-10-13 RX ADMIN — KETOROLAC TROMETHAMINE 15 MG: 30 INJECTION, SOLUTION INTRAMUSCULAR at 15:47

## 2021-10-13 RX ADMIN — ONDANSETRON 4 MG: 2 INJECTION INTRAMUSCULAR; INTRAVENOUS at 16:20

## 2021-10-13 RX ADMIN — CEFTRIAXONE 1000 MG: 1 INJECTION, SOLUTION INTRAVENOUS at 15:14

## 2021-10-13 RX ADMIN — HALOPERIDOL LACTATE 2 MG: 5 INJECTION, SOLUTION INTRAMUSCULAR at 15:50

## 2021-10-13 RX ADMIN — SODIUM CHLORIDE 1000 ML: 0.9 INJECTION, SOLUTION INTRAVENOUS at 14:21

## 2021-10-13 RX ADMIN — ONDANSETRON 4 MG: 2 INJECTION INTRAMUSCULAR; INTRAVENOUS at 14:20

## 2021-10-16 LAB — BACTERIA UR CULT: ABNORMAL

## 2022-07-28 ENCOUNTER — APPOINTMENT (EMERGENCY)
Dept: ULTRASOUND IMAGING | Facility: HOSPITAL | Age: 37
End: 2022-07-28
Payer: COMMERCIAL

## 2022-07-28 ENCOUNTER — HOSPITAL ENCOUNTER (EMERGENCY)
Facility: HOSPITAL | Age: 37
Discharge: HOME | End: 2022-07-28
Attending: EMERGENCY MEDICINE
Payer: COMMERCIAL

## 2022-07-28 ENCOUNTER — APPOINTMENT (EMERGENCY)
Dept: CT IMAGING | Facility: HOSPITAL | Age: 37
End: 2022-07-28
Payer: COMMERCIAL

## 2022-07-28 VITALS
OXYGEN SATURATION: 98 % | SYSTOLIC BLOOD PRESSURE: 122 MMHG | RESPIRATION RATE: 16 BRPM | HEART RATE: 68 BPM | DIASTOLIC BLOOD PRESSURE: 74 MMHG | TEMPERATURE: 97.1 F

## 2022-07-28 DIAGNOSIS — R11.2 NAUSEA AND VOMITING: ICD-10-CM

## 2022-07-28 DIAGNOSIS — K60.3 PERIANAL FISTULA: Primary | ICD-10-CM

## 2022-07-28 DIAGNOSIS — K62.89 RECTAL PAIN: ICD-10-CM

## 2022-07-28 LAB
ALBUMIN SERPL BCP-MCNC: 3.7 G/DL (ref 3.5–5)
ALP SERPL-CCNC: 55 U/L (ref 46–116)
ALT SERPL W P-5'-P-CCNC: 14 U/L (ref 12–78)
ANION GAP SERPL CALCULATED.3IONS-SCNC: 6 MMOL/L (ref 4–13)
AST SERPL W P-5'-P-CCNC: 14 U/L (ref 5–45)
BACTERIA UR QL AUTO: ABNORMAL /HPF
BASOPHILS # BLD AUTO: 0.05 THOUSANDS/ΜL (ref 0–0.1)
BASOPHILS NFR BLD AUTO: 1 % (ref 0–1)
BILIRUB SERPL-MCNC: 0.5 MG/DL (ref 0.2–1)
BILIRUB UR QL STRIP: NEGATIVE
BUN SERPL-MCNC: 8 MG/DL (ref 5–25)
CALCIUM SERPL-MCNC: 8.8 MG/DL (ref 8.3–10.1)
CHLORIDE SERPL-SCNC: 102 MMOL/L (ref 96–108)
CLARITY UR: ABNORMAL
CO2 SERPL-SCNC: 30 MMOL/L (ref 21–32)
COLOR UR: ABNORMAL
CREAT SERPL-MCNC: 0.8 MG/DL (ref 0.6–1.3)
EOSINOPHIL # BLD AUTO: 0.17 THOUSAND/ΜL (ref 0–0.61)
EOSINOPHIL NFR BLD AUTO: 2 % (ref 0–6)
ERYTHROCYTE [DISTWIDTH] IN BLOOD BY AUTOMATED COUNT: 11.9 % (ref 11.6–15.1)
EXT PREG TEST URINE: NORMAL
EXT. CONTROL ED NAV: NORMAL
GFR SERPL CREATININE-BSD FRML MDRD: 95 ML/MIN/1.73SQ M
GLUCOSE SERPL-MCNC: 98 MG/DL (ref 65–140)
GLUCOSE UR STRIP-MCNC: NEGATIVE MG/DL
HCT VFR BLD AUTO: 45 % (ref 34.8–46.1)
HGB BLD-MCNC: 14.9 G/DL (ref 11.5–15.4)
HGB UR QL STRIP.AUTO: NEGATIVE
IMM GRANULOCYTES # BLD AUTO: 0.03 THOUSAND/UL (ref 0–0.2)
IMM GRANULOCYTES NFR BLD AUTO: 0 % (ref 0–2)
KETONES UR STRIP-MCNC: ABNORMAL MG/DL
LEUKOCYTE ESTERASE UR QL STRIP: NEGATIVE
LIPASE SERPL-CCNC: 114 U/L (ref 73–393)
LYMPHOCYTES # BLD AUTO: 1.97 THOUSANDS/ΜL (ref 0.6–4.47)
LYMPHOCYTES NFR BLD AUTO: 19 % (ref 14–44)
MCH RBC QN AUTO: 31.8 PG (ref 26.8–34.3)
MCHC RBC AUTO-ENTMCNC: 33.1 G/DL (ref 31.4–37.4)
MCV RBC AUTO: 96 FL (ref 82–98)
MONOCYTES # BLD AUTO: 0.45 THOUSAND/ΜL (ref 0.17–1.22)
MONOCYTES NFR BLD AUTO: 4 % (ref 4–12)
MUCOUS THREADS UR QL AUTO: ABNORMAL
NEUTROPHILS # BLD AUTO: 7.5 THOUSANDS/ΜL (ref 1.85–7.62)
NEUTS SEG NFR BLD AUTO: 74 % (ref 43–75)
NITRITE UR QL STRIP: NEGATIVE
NON-SQ EPI CELLS URNS QL MICRO: ABNORMAL /HPF
NRBC BLD AUTO-RTO: 0 /100 WBCS
PH UR STRIP.AUTO: 7 [PH] (ref 4.5–8)
PLATELET # BLD AUTO: 276 THOUSANDS/UL (ref 149–390)
PMV BLD AUTO: 9 FL (ref 8.9–12.7)
POTASSIUM SERPL-SCNC: 4 MMOL/L (ref 3.5–5.3)
PROT SERPL-MCNC: 7.8 G/DL (ref 6.4–8.4)
PROT UR STRIP-MCNC: ABNORMAL MG/DL
RBC # BLD AUTO: 4.69 MILLION/UL (ref 3.81–5.12)
RBC #/AREA URNS AUTO: ABNORMAL /HPF
SODIUM SERPL-SCNC: 138 MMOL/L (ref 135–147)
SP GR UR STRIP.AUTO: 1.02 (ref 1–1.03)
UROBILINOGEN UR QL STRIP.AUTO: 0.2 E.U./DL
WBC # BLD AUTO: 10.17 THOUSAND/UL (ref 4.31–10.16)
WBC #/AREA URNS AUTO: ABNORMAL /HPF

## 2022-07-28 PROCEDURE — 81001 URINALYSIS AUTO W/SCOPE: CPT

## 2022-07-28 PROCEDURE — 99284 EMERGENCY DEPT VISIT MOD MDM: CPT

## 2022-07-28 PROCEDURE — 36415 COLL VENOUS BLD VENIPUNCTURE: CPT | Performed by: PHYSICIAN ASSISTANT

## 2022-07-28 PROCEDURE — 96365 THER/PROPH/DIAG IV INF INIT: CPT

## 2022-07-28 PROCEDURE — G1004 CDSM NDSC: HCPCS

## 2022-07-28 PROCEDURE — 96376 TX/PRO/DX INJ SAME DRUG ADON: CPT

## 2022-07-28 PROCEDURE — 96361 HYDRATE IV INFUSION ADD-ON: CPT

## 2022-07-28 PROCEDURE — 83690 ASSAY OF LIPASE: CPT | Performed by: PHYSICIAN ASSISTANT

## 2022-07-28 PROCEDURE — 72193 CT PELVIS W/DYE: CPT

## 2022-07-28 PROCEDURE — 82272 OCCULT BLD FECES 1-3 TESTS: CPT

## 2022-07-28 PROCEDURE — 80053 COMPREHEN METABOLIC PANEL: CPT | Performed by: PHYSICIAN ASSISTANT

## 2022-07-28 PROCEDURE — 81025 URINE PREGNANCY TEST: CPT | Performed by: PHYSICIAN ASSISTANT

## 2022-07-28 PROCEDURE — 85025 COMPLETE CBC W/AUTO DIFF WBC: CPT | Performed by: PHYSICIAN ASSISTANT

## 2022-07-28 PROCEDURE — 96375 TX/PRO/DX INJ NEW DRUG ADDON: CPT

## 2022-07-28 PROCEDURE — 76882 US LMTD JT/FCL EVL NVASC XTR: CPT

## 2022-07-28 PROCEDURE — 99285 EMERGENCY DEPT VISIT HI MDM: CPT | Performed by: PHYSICIAN ASSISTANT

## 2022-07-28 RX ORDER — SULFAMETHOXAZOLE AND TRIMETHOPRIM 800; 160 MG/1; MG/1
1 TABLET ORAL 2 TIMES DAILY
Qty: 14 TABLET | Refills: 0 | Status: SHIPPED | OUTPATIENT
Start: 2022-07-28 | End: 2022-08-04

## 2022-07-28 RX ORDER — KETOROLAC TROMETHAMINE 30 MG/ML
15 INJECTION, SOLUTION INTRAMUSCULAR; INTRAVENOUS ONCE
Status: COMPLETED | OUTPATIENT
Start: 2022-07-28 | End: 2022-07-28

## 2022-07-28 RX ORDER — IBUPROFEN 600 MG/1
600 TABLET ORAL EVERY 6 HOURS PRN
Qty: 30 TABLET | Refills: 0 | Status: SHIPPED | OUTPATIENT
Start: 2022-07-28

## 2022-07-28 RX ORDER — METRONIDAZOLE 500 MG/1
500 TABLET ORAL ONCE
Status: COMPLETED | OUTPATIENT
Start: 2022-07-28 | End: 2022-07-28

## 2022-07-28 RX ORDER — LIDOCAINE 5 G/100G
CREAM RECTAL; TOPICAL 3 TIMES DAILY PRN
Qty: 113 G | Refills: 0 | Status: SHIPPED | OUTPATIENT
Start: 2022-07-28

## 2022-07-28 RX ORDER — ONDANSETRON 4 MG/1
4 TABLET, ORALLY DISINTEGRATING ORAL EVERY 6 HOURS PRN
Qty: 20 TABLET | Refills: 0 | Status: SHIPPED | OUTPATIENT
Start: 2022-07-28

## 2022-07-28 RX ORDER — ONDANSETRON 2 MG/ML
4 INJECTION INTRAMUSCULAR; INTRAVENOUS ONCE
Status: COMPLETED | OUTPATIENT
Start: 2022-07-28 | End: 2022-07-28

## 2022-07-28 RX ADMIN — ONDANSETRON 4 MG: 2 INJECTION INTRAMUSCULAR; INTRAVENOUS at 14:28

## 2022-07-28 RX ADMIN — KETOROLAC TROMETHAMINE 15 MG: 30 INJECTION, SOLUTION INTRAMUSCULAR; INTRAVENOUS at 15:23

## 2022-07-28 RX ADMIN — MORPHINE SULFATE 2 MG: 2 INJECTION, SOLUTION INTRAMUSCULAR; INTRAVENOUS at 20:24

## 2022-07-28 RX ADMIN — CEFTRIAXONE SODIUM 1000 MG: 10 INJECTION, POWDER, FOR SOLUTION INTRAVENOUS at 19:06

## 2022-07-28 RX ADMIN — IOHEXOL 70 ML: 350 INJECTION, SOLUTION INTRAVENOUS at 15:21

## 2022-07-28 RX ADMIN — SODIUM CHLORIDE 1000 ML: 9 INJECTION, SOLUTION INTRAVENOUS at 14:26

## 2022-07-28 RX ADMIN — METRONIDAZOLE 500 MG: 500 TABLET ORAL at 19:06

## 2022-07-28 RX ADMIN — MORPHINE SULFATE 2 MG: 2 INJECTION, SOLUTION INTRAMUSCULAR; INTRAVENOUS at 17:09

## 2022-07-28 NOTE — ED NOTES
Pt aware that urine sample is needed before pain medication can be administered  Will ring when able to provide sample        Marco Antonio Frias RN  07/28/22 5580

## 2022-07-28 NOTE — ED PROVIDER NOTES
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 who presents with perirectal pain over the last 5 days  Patient reports that she had multiple surgeries for her perirectal abscess  She initially had 1 in [place] and then had to here in the [place]  She was post to have a 3rd surgery, but did not end up following up secondary to Vishnu  Patient states she had been doing well, over the last few days she has noted significantly increasing pain at the site of her prior incision and around her rectum  Patient states it feels like the prior time she had a perirectal abscess  This morning she did have a bowel movement, but noted significant pain with bowel movement and a few streaks of blood on the stool  Patient denies any known drainage from the area or from the rectum  She has not taken anything to help alleviate the pain  The pain makes it nearly impossible for her to sit  Patient reports that she woke up this morning not feeling well with body aches, diaphoresis and nausea and 1 episode of nonbloody, nonbilious vomiting  She denies any associated abdominal pain, repeat episodes of vomiting, no fever, chills  Patient states she is otherwise in her usual state of health and denies any headache, dizziness, lightheadedness, congestion, cough, shortness of breath, chest pain, palpitations, recent travel, known sick contacts, new foods or medications  None       History reviewed  No pertinent past medical history  History reviewed  No pertinent surgical history  History reviewed  No pertinent family history  I have reviewed and agree with the history as documented      E-Cigarette/Vaping    E-Cigarette Use Never User E-Cigarette/Vaping Substances    Nicotine No     THC No     CBD No     Flavoring No     Other No     Unknown No      Social History     Tobacco Use    Smoking status: Current Every Day Smoker     Packs/day: 0 25     Types: Cigarettes    Smokeless tobacco: Never Used   Vaping Use    Vaping Use: Never used   Substance Use Topics    Alcohol use: Not Currently    Drug use: Yes     Types: Marijuana       Review of Systems   Constitutional: Positive for diaphoresis  Negative for chills and fever  HENT: Negative for congestion  Eyes: Negative for visual disturbance  Respiratory: Negative for cough, shortness of breath, wheezing and stridor  Cardiovascular: Negative for chest pain, palpitations and leg swelling  Gastrointestinal: Positive for blood in stool, nausea, rectal pain and vomiting  Negative for abdominal pain, anal bleeding, constipation and diarrhea  Genitourinary: Negative for difficulty urinating, dysuria, frequency, hematuria and urgency  Musculoskeletal: Positive for myalgias  Negative for neck pain and neck stiffness  Skin: Negative for color change, pallor and rash  Neurological: Negative for dizziness, weakness, light-headedness, numbness and headaches  All other systems reviewed and are negative  Physical Exam  Physical Exam  Vitals and nursing note reviewed  Exam conducted with a chaperone present (PA student)  Constitutional:       General: She is awake  She is not in acute distress  Appearance: She is well-developed  She is not ill-appearing, toxic-appearing or diaphoretic  HENT:      Head: Normocephalic and atraumatic  Right Ear: External ear normal       Left Ear: External ear normal       Nose: Nose normal    Eyes:      Conjunctiva/sclera: Conjunctivae normal       Pupils: Pupils are equal, round, and reactive to light  Cardiovascular:      Rate and Rhythm: Normal rate and regular rhythm  Pulses: Normal pulses        Heart sounds: Normal heart sounds  Pulmonary:      Effort: Pulmonary effort is normal  No respiratory distress  Breath sounds: Normal breath sounds  No stridor  No decreased breath sounds or wheezing  Abdominal:      General: Bowel sounds are normal  There is no distension  Palpations: Abdomen is soft  Tenderness: There is no abdominal tenderness  There is no right CVA tenderness, left CVA tenderness, guarding or rebound  Genitourinary:     Rectum: Guaiac result negative  Tenderness present  No external hemorrhoid  Normal anal tone  Comments: Patient with old surgical scar at 9 o'clock position immediately adjacent to the rectum  Significant tenderness to palpation with small area of swelling and erythema on the edge of the rectum  Also TTP  Some clear/ slightly discolored fluid noted per rectum  Attempted rectal exam, but patient unable to tolerate due to pain  Guaiac completed with brown stool noted, guaiac negative  Musculoskeletal:         General: Normal range of motion  Cervical back: Normal range of motion and neck supple  Skin:     General: Skin is warm and dry  Capillary Refill: Capillary refill takes less than 2 seconds  Neurological:      Mental Status: She is alert and oriented to person, place, and time  Psychiatric:         Behavior: Behavior is cooperative           Vital Signs  ED Triage Vitals   Temperature Pulse Respirations Blood Pressure SpO2   07/28/22 1305 07/28/22 1305 07/28/22 1305 07/28/22 1305 07/28/22 1307   (!) 97 1 °F (36 2 °C) 86 16 115/79 98 %      Temp Source Heart Rate Source Patient Position - Orthostatic VS BP Location FiO2 (%)   07/28/22 1305 07/28/22 1305 07/28/22 1305 07/28/22 1305 --   Oral Monitor Sitting Right arm       Pain Score       07/28/22 1709       10 - Worst Possible Pain           Vitals:    07/28/22 1549 07/28/22 1807 07/28/22 2021 07/28/22 2100   BP: 108/70 111/65 91/52 122/74   Pulse: 77 73 68 68   Patient Position - Orthostatic VS: Lying Lying Lying          Visual Acuity      ED Medications  Medications   sodium chloride 0 9 % bolus 1,000 mL (0 mL Intravenous Stopped 7/28/22 1842)   ondansetron (ZOFRAN) injection 4 mg (4 mg Intravenous Given 7/28/22 1428)   ketorolac (TORADOL) injection 15 mg (15 mg Intravenous Given 7/28/22 1523)   iohexol (OMNIPAQUE) 350 MG/ML injection (MULTI-DOSE) 70 mL (70 mL Intravenous Given 7/28/22 1521)   morphine injection 2 mg (2 mg Intravenous Given 7/28/22 1709)   ceftriaxone (ROCEPHIN) 1 g/50 mL in dextrose IVPB (0 mg Intravenous Stopped 7/28/22 1936)   metroNIDAZOLE (FLAGYL) tablet 500 mg (500 mg Oral Given 7/28/22 1906)   morphine injection 2 mg (2 mg Intravenous Given 7/28/22 2024)       Diagnostic Studies  Results Reviewed     Procedure Component Value Units Date/Time    Urine Microscopic [[id number]]  (Abnormal) Collected: 07/28/22 1455    Lab Status: Final result Specimen: Urine, Clean Catch Updated: 07/28/22 1516     RBC, UA None Seen /hpf      WBC, UA 1-2 /hpf      Epithelial Cells Moderate /hpf      Bacteria, UA Innumerable /hpf      MUCUS THREADS Occasional    Comprehensive metabolic panel [[id number]] Collected: 07/28/22 1429    Lab Status: Final result Specimen: Blood from Arm, Left Updated: 07/28/22 1500     Sodium 138 mmol/L      Potassium 4 0 mmol/L      Chloride 102 mmol/L      CO2 30 mmol/L      ANION GAP 6 mmol/L      BUN 8 mg/dL      Creatinine 0 80 mg/dL      Glucose 98 mg/dL      Calcium 8 8 mg/dL      AST 14 U/L      ALT 14 U/L      Alkaline Phosphatase 55 U/L      Total Protein 7 8 g/dL      Albumin 3 7 g/dL      Total Bilirubin 0 50 mg/dL      eGFR 95 ml/min/1 73sq m     Narrative:      [place] guidelines for Chronic Kidney Disease (CKD):     Stage 1 with normal or high GFR (GFR > 90 mL/min/1 73 square meters)    Stage 2 Mild CKD (GFR = 60-89 mL/min/1 73 square meters)    Stage 3A Moderate CKD (GFR = 45-59 mL/min/1 73 square meters)    Stage 3B Moderate CKD (GFR = 30-44 mL/min/1 73 square meters)    Stage 4 Severe CKD (GFR = 15-29 mL/min/1 73 square meters)    Stage 5 End Stage CKD (GFR <15 mL/min/1 73 square meters)  Note: GFR calculation is accurate only with a steady state creatinine    Lipase [[id number]]  (Normal) Collected: 07/28/22 1429    Lab Status: Final result Specimen: Blood from Arm, Left Updated: 07/28/22 1500     Lipase 114 u/L     POCT pregnancy, urine [[id number]]  (Normal) Resulted: 07/28/22 1457    Lab Status: Final result Updated: 07/28/22 1457     EXT PREG TEST UR (Ref: Negative) neg(-)     Control valid    Urine Macroscopic, POC [[id number]]  (Abnormal) Collected: 07/28/22 1455    Lab Status: Final result Specimen: Urine Updated: 07/28/22 1456     Color, UA Brown     Clarity, UA Cloudy     pH, UA 7 0     Leukocytes, UA Negative     Nitrite, UA Negative     Protein, UA 30 (1+) mg/dl      Glucose, UA Negative mg/dl      Ketones, UA Trace mg/dl      Urobilinogen, UA 0 2 E U /dl      Bilirubin, UA Negative     Occult Blood, UA Negative     Specific Gravity, UA 1 020    Narrative:      CLINITEK RESULT    CBC and differential [[id number]]  (Abnormal) Collected: 07/28/22 1429    Lab Status: Final result Specimen: Blood from Arm, Left Updated: 07/28/22 1434     WBC 10 17 Thousand/uL      RBC 4 69 Million/uL      Hemoglobin 14 9 g/dL      Hematocrit 45 0 %      MCV 96 fL      MCH 31 8 pg      MCHC 33 1 g/dL      RDW 11 9 %      MPV 9 0 fL      Platelets 085 Thousands/uL      nRBC 0 /100 WBCs      Neutrophils Relative 74 %      Immat GRANS % 0 %      Lymphocytes Relative 19 %      Monocytes Relative 4 %      Eosinophils Relative 2 %      Basophils Relative 1 %      Neutrophils Absolute 7 50 Thousands/µL      Immature Grans Absolute 0 03 Thousand/uL      Lymphocytes Absolute 1 97 Thousands/µL      Monocytes Absolute 0 45 Thousand/µL      Eosinophils Absolute 0 17 Thousand/µL      Basophils Absolute 0 05 Thousands/µL                  US extremity soft tissue   Final Result by Lena Polanco MD (07/28 1952)      Tiny fluid collection within an area of perianal phlegmon extending to the skin surface at location of reported drainage, compatible with a seroma or abscess with the presumed skin fistula not visualized  The study was marked in EPIC for significant notification  Workstation performed: [id number]         CT pelvis w contrast   Final Result by Lena Polanco MD (07/28 1554)      Presumed intersphincteric perianal fistula extends into the subcutaneous soft tissues of the left buttock and skin of the intergluteal fold the sigmoid with associated inflammatory changes but no visualized discrete abscess or fistula  Consider MRI for    evaluation of the precise relationship to the anal sphincter complex  The study was marked in EPIC for significant notification  Workstation performed: [id number]                    Procedures  Procedures         ED Course  ED Course as of 07/28/22 2157   Thu Jul 28, 2022   1601 CT pelvis w contrast  IMPRESSION:     Presumed intersphincteric perianal fistula extends into the subcutaneous soft tissues of the left buttock and skin of the intergluteal fold the sigmoid with associated inflammatory changes but no visualized discrete abscess or fistula  Consider MRI for   evaluation of the precise relationship to the anal sphincter complex  1630 Discussed case with Surgery resident  No drainable abscess, so nothing for general surgery to do  Patient should be evaluated by colorectal   1651 This with patient, answered questions  Patient states the pain is returning, will give more pain medication  Discussed with patient she needs colorectal evaluation given that she has had multiple surgeries at [facility], discussed transfer to [facility] for colorectal evaluation  Patient is refusing to go to [facility] stating she does not want further care at that location    Will discuss with surgery here to talk to colorectal at [facility]  1954  extremity soft tissue  IMPRESSION:     Tiny fluid collection within an area of perianal phlegmon extending to the skin surface at location of reported drainage, compatible with a seroma or abscess with the presumed skin fistula not visualized  2001 Paged surgery with findings   2040 I personally spoke with Colorectal, Dr Erick Dewitt, reviewed case and ED course  He also reviewed images  He believes that findings are consistent with chronic fistula with no acute abscess or infection noted  Can sent home with course of Bactrim to cover for infection  He reports that patient would not require any inpatient workup at this time  He will happily see in the office, can place referral    2105 Extensive discussion with patient regarding findings, conversation with colorectal, treatment plan  Patient is upset stating that she wants everything fixed right now  Again reviewed discussion with colorectal with patient that there is nothing emergent that they would do  Patient states she understands, but is frustrated that the specialist did not come to bedside to see her  Discussed with patient and provided education  Patient is agreeable to plan and is requesting to go home  Patient stable for discharge  SBIRT 20yo+    Flowsheet Row Most Recent Value   SBIRT (25 yo +)    In order to provide better care to our patients, we are screening all of our patients for alcohol and drug use  Would it be okay to ask you these screening questions? No Filed at: 07/28/2022 1429                    MDM  Number of Diagnoses or Management Options  Nausea and vomiting  Perianal fistula  Rectal pain  Diagnosis management comments: See ED course for full discussion regarding patient's symptoms and CT findings  Patient did note improvement of pain    Reviewed medication education, treatment at home, discussion with colorectal   Reviewed all results with patient, answered questions  Recommended follow-up with colorectal as soon as possible for further evaluation of symptoms  Recommended follow-up with PCP for monitoring of symptoms  The management plan was discussed in detail with the patient at bedside and all questions were answered  Provided both verbal and written instructions  Reviewed red flag symptoms and strict return to ED instructions  Patient notes understanding and agrees to plan  Disposition  Final diagnoses:   Perianal fistula   Rectal pain   Nausea and vomiting     Time reflects when diagnosis was documented in both MDM as applicable and the Disposition within this note     Time User Action Codes Description Comment    7/28/2022  9:06 PM Bertis Rides Add [K60 3] Perianal fistula     7/28/2022  9:06 PM Bertis Rides Add [K62 89] Rectal pain     7/28/2022  9:06 PM [place], [address] [R11 2] Nausea and vomiting       ED Disposition     ED Disposition   Discharge    Condition   Stable    Date/Time   Thu Jul 28, 2022  9:06 PM    Comment   Gillian Ferraro discharge to home/self care                 Follow-up Information     Follow up With Specialties Details Why Contact Info Additional Information    [address] [facility] Emergency Department Emergency Medicine  If symptoms worsen [facility] [id number]  [address] [place] [address] Emergency Department, [address] [place] [address] Monique Mota MD Colon and Rectal Surgery   [address] [facility]  [phone]       Tino Mclaughlin MD Colon and Rectal Surgery   [address]  [address] [facility]  [phone]             Discharge Medication List as of 7/28/2022  9:21 PM      START taking these medications    Details   ibuprofen (MOTRIN) 600 mg tablet Take 1 tablet (600 mg total) by mouth every 6 (six) hours as needed for mild pain or moderate pain, Starting Thu 7/28/2022, Normal      Lidocaine, Anorectal, (RectiCare) 5 % CREA Apply topically 3 (three) times a day as needed (rectal pain), Starting Thu 7/28/2022, Normal      ondansetron (ZOFRAN-ODT) 4 mg disintegrating tablet Take 1 tablet (4 mg total) by mouth every 6 (six) hours as needed for nausea or vomiting, Starting Thu 7/28/2022, Normal      sulfamethoxazole-trimethoprim (BACTRIM DS) 800-160 mg per tablet Take 1 tablet by mouth 2 (two) times a day for 7 days smx-tmp DS (BACTRIM) 800-160 mg tabs (1tab q12 D10), Starting u 7/28/2022, Until Thu 8/4/2022, Normal                 PDMP Review     None          ED Provider  Electronically Signed by           Aneta Pinto PA-C  07/28/22 2322

## 2022-07-29 NOTE — DISCHARGE INSTRUCTIONS
Take Bactrim as prescribed  Take ibuprofen as prescribed as needed for pain  Use rectal cream as prescribed as needed for pain  Take Zofran as prescribed as needed for nausea and vomiting  Take Motrin or Tylenol for pain  Rest and drink plenty of fluids  Slow introduction of foods like broth, bread, rice, and other bland foods  Follow-up with Colorectal surgery as soon as possible for further evaluation of symptoms  Return to ED if symptoms worsen including increasing pain, inability to tolerate food or fluid, blood in vomit or stool, fevers

## 2023-02-25 ENCOUNTER — HOSPITAL ENCOUNTER (EMERGENCY)
Facility: HOSPITAL | Age: 38
Discharge: HOME | End: 2023-02-25
Attending: EMERGENCY MEDICINE

## 2023-02-25 VITALS
TEMPERATURE: 98 F | RESPIRATION RATE: 16 BRPM | HEART RATE: 55 BPM | OXYGEN SATURATION: 97 % | SYSTOLIC BLOOD PRESSURE: 142 MMHG | DIASTOLIC BLOOD PRESSURE: 69 MMHG

## 2023-02-25 DIAGNOSIS — R74.01 TRANSAMINASEMIA: ICD-10-CM

## 2023-02-25 DIAGNOSIS — R82.4 KETONURIA: ICD-10-CM

## 2023-02-25 DIAGNOSIS — F12.188 CANNABIS HYPEREMESIS SYNDROME CONCURRENT WITH AND DUE TO CANNABIS ABUSE (HCC): Primary | ICD-10-CM

## 2023-02-25 LAB
ALBUMIN SERPL BCP-MCNC: 5.1 G/DL (ref 3.5–5)
ALP SERPL-CCNC: 33 U/L (ref 34–104)
ALT SERPL W P-5'-P-CCNC: 9 U/L (ref 7–52)
AMORPH URATE CRY URNS QL MICRO: ABNORMAL
AMPHETAMINES SERPL QL SCN: NEGATIVE
ANION GAP SERPL CALCULATED.3IONS-SCNC: 11 MMOL/L (ref 4–13)
AST SERPL W P-5'-P-CCNC: 19 U/L (ref 13–39)
ATRIAL RATE: 49 BPM
B-HCG SERPL-ACNC: <1 MIU/ML (ref 0–11.6)
BACTERIA UR QL AUTO: ABNORMAL /HPF
BARBITURATES UR QL: NEGATIVE
BASOPHILS # BLD AUTO: 0.02 THOUSANDS/ÂΜL (ref 0–0.1)
BASOPHILS NFR BLD AUTO: 0 % (ref 0–1)
BENZODIAZ UR QL: NEGATIVE
BILIRUB SERPL-MCNC: 1.18 MG/DL (ref 0.2–1)
BILIRUB UR QL STRIP: NEGATIVE
BUN SERPL-MCNC: 14 MG/DL (ref 5–25)
CALCIUM SERPL-MCNC: 10 MG/DL (ref 8.4–10.2)
CHLORIDE SERPL-SCNC: 103 MMOL/L (ref 96–108)
CLARITY UR: ABNORMAL
CO2 SERPL-SCNC: 25 MMOL/L (ref 21–32)
COCAINE UR QL: NEGATIVE
COLOR UR: YELLOW
CREAT SERPL-MCNC: 0.72 MG/DL (ref 0.6–1.3)
EOSINOPHIL # BLD AUTO: 0.04 THOUSAND/ÂΜL (ref 0–0.61)
EOSINOPHIL NFR BLD AUTO: 0 % (ref 0–6)
ERYTHROCYTE [DISTWIDTH] IN BLOOD BY AUTOMATED COUNT: 11.8 % (ref 11.6–15.1)
EXT PREGNANCY TEST URINE: NEGATIVE
EXT. CONTROL: NORMAL
GFR SERPL CREATININE-BSD FRML MDRD: 108 ML/MIN/1.73SQ M
GLUCOSE SERPL-MCNC: 129 MG/DL (ref 65–140)
GLUCOSE UR STRIP-MCNC: NEGATIVE MG/DL
HCT VFR BLD AUTO: 41.3 % (ref 34.8–46.1)
HGB BLD-MCNC: 14 G/DL (ref 11.5–15.4)
HGB UR QL STRIP.AUTO: NEGATIVE
IMM GRANULOCYTES # BLD AUTO: 0.03 THOUSAND/UL (ref 0–0.2)
IMM GRANULOCYTES NFR BLD AUTO: 0 % (ref 0–2)
KETONES UR STRIP-MCNC: ABNORMAL MG/DL
LEUKOCYTE ESTERASE UR QL STRIP: NEGATIVE
LIPASE SERPL-CCNC: 15 U/L (ref 11–82)
LYMPHOCYTES # BLD AUTO: 1.78 THOUSANDS/ÂΜL (ref 0.6–4.47)
LYMPHOCYTES NFR BLD AUTO: 14 % (ref 14–44)
MAGNESIUM SERPL-MCNC: 1.9 MG/DL (ref 1.9–2.7)
MCH RBC QN AUTO: 31.7 PG (ref 26.8–34.3)
MCHC RBC AUTO-ENTMCNC: 33.9 G/DL (ref 31.4–37.4)
MCV RBC AUTO: 93 FL (ref 82–98)
METHADONE UR QL: NEGATIVE
MONOCYTES # BLD AUTO: 0.66 THOUSAND/ÂΜL (ref 0.17–1.22)
MONOCYTES NFR BLD AUTO: 5 % (ref 4–12)
NEUTROPHILS # BLD AUTO: 9.93 THOUSANDS/ÂΜL (ref 1.85–7.62)
NEUTS SEG NFR BLD AUTO: 81 % (ref 43–75)
NITRITE UR QL STRIP: NEGATIVE
NON-SQ EPI CELLS URNS QL MICRO: ABNORMAL /HPF
NRBC BLD AUTO-RTO: 0 /100 WBCS
OPIATES UR QL SCN: NEGATIVE
OXYCODONE+OXYMORPHONE UR QL SCN: NEGATIVE
P AXIS: 56 DEGREES
PCP UR QL: NEGATIVE
PH UR STRIP.AUTO: 6.5 [PH]
PLATELET # BLD AUTO: 325 THOUSANDS/UL (ref 149–390)
PMV BLD AUTO: 10.9 FL (ref 8.9–12.7)
POTASSIUM SERPL-SCNC: 3.9 MMOL/L (ref 3.5–5.3)
PR INTERVAL: 120 MS
PROT SERPL-MCNC: 9 G/DL (ref 6.4–8.4)
PROT UR STRIP-MCNC: ABNORMAL MG/DL
QRS AXIS: 65 DEGREES
QRSD INTERVAL: 86 MS
QT INTERVAL: 458 MS
QTC INTERVAL: 413 MS
RBC # BLD AUTO: 4.42 MILLION/UL (ref 3.81–5.12)
RBC #/AREA URNS AUTO: ABNORMAL /HPF
SODIUM SERPL-SCNC: 139 MMOL/L (ref 135–147)
SP GR UR STRIP.AUTO: >=1.03 (ref 1–1.03)
T WAVE AXIS: 56 DEGREES
THC UR QL: POSITIVE
UROBILINOGEN UR STRIP-ACNC: <2 MG/DL
VENTRICULAR RATE: 49 BPM
WBC # BLD AUTO: 12.46 THOUSAND/UL (ref 4.31–10.16)
WBC #/AREA URNS AUTO: ABNORMAL /HPF

## 2023-02-25 RX ORDER — METOCLOPRAMIDE 10 MG/1
10 TABLET ORAL EVERY 6 HOURS PRN
Qty: 12 TABLET | Refills: 0 | Status: SHIPPED | OUTPATIENT
Start: 2023-02-25

## 2023-02-25 RX ORDER — METOCLOPRAMIDE HYDROCHLORIDE 5 MG/ML
10 INJECTION INTRAMUSCULAR; INTRAVENOUS ONCE
Status: COMPLETED | OUTPATIENT
Start: 2023-02-25 | End: 2023-02-25

## 2023-02-25 RX ORDER — PANTOPRAZOLE SODIUM 40 MG/10ML
40 INJECTION, POWDER, LYOPHILIZED, FOR SOLUTION INTRAVENOUS ONCE
Status: COMPLETED | OUTPATIENT
Start: 2023-02-25 | End: 2023-02-25

## 2023-02-25 RX ORDER — HALOPERIDOL 5 MG/ML
5 INJECTION INTRAMUSCULAR ONCE
Status: COMPLETED | OUTPATIENT
Start: 2023-02-25 | End: 2023-02-25

## 2023-02-25 RX ADMIN — SODIUM CHLORIDE, SODIUM LACTATE, POTASSIUM CHLORIDE, AND CALCIUM CHLORIDE 1000 ML: .6; .31; .03; .02 INJECTION, SOLUTION INTRAVENOUS at 07:14

## 2023-02-25 RX ADMIN — PANTOPRAZOLE SODIUM 40 MG: 40 INJECTION, POWDER, FOR SOLUTION INTRAVENOUS at 07:14

## 2023-02-25 RX ADMIN — HALOPERIDOL LACTATE 5 MG: 5 INJECTION, SOLUTION INTRAMUSCULAR at 07:30

## 2023-02-25 RX ADMIN — METOCLOPRAMIDE 10 MG: 5 INJECTION, SOLUTION INTRAMUSCULAR; INTRAVENOUS at 07:14

## 2023-02-25 NOTE — ED NOTES
ED Nurse  informed [facility] that ED Physician place a order for warm hand off, ED Nurse also informed [facility] that patient does not want to speak with anyone from [facility]  [facility] discussed this case with ED Physician who also informed [facility] that patient is refusing to speak with [facility]  Patient will be discharged per ED Physician       Vinicio Oneal Crisis Intervention Specialist II

## 2023-02-25 NOTE — DISCHARGE INSTRUCTIONS
Try metoclopramide for nausea  Do not take it if you also are taking Compazine or Zofran  Cut down on marijuana use and attempt to stop smoking  Contact someone from the list of resources given to you if you need help in stopping the marijuana  Your liver enzymes are elevated  You need this rechecked in a few weeks  Contact your doctor or the gastroenterologist who know you  You also can call the [employer] to find a local [facility]          National Suicide Prevention Hotline:  [address] 5-867-020-560-494-6005 - LVF Crisis/Mobile Crisis   [address]: [phone]  [facility] [place]: [address] [phone] - Crisis   [phone] - Peer Support Talk Line (1-9pm daily)  [phone] - Teen Support Talk Line (1-9pm daily)  [address] ([place]) [phone] - [address]

## 2023-02-25 NOTE — ED NOTES
Spoke to crisis pt does not want any intervention at this time, does not want Bcares  Crisis notified, provider notified        Seng Kenney RN  02/25/23 0731

## 2023-02-25 NOTE — ED PROVIDER NOTES
History  Chief Complaint   Patient presents with   • Abdominal Pain     Patient c/o mid center abdominal pain that started Thursday along with nausea and vomiting  This 60-year-old female with history of cannabis hyperemesis syndrome, perirectal abscess and status post lumbar discectomy and laminectomy complains of nausea and vomiting  She states this started 2 days ago  She was unable to keep anything down yesterday and has been vomiting nearly constantly  She did have some orange-colored emesis this morning but thinks it was something she tried to drink  Denies diarrhea but has had soft nonbloody stool  Denies fever and dysuria  Endorses decreased urine output, lightheadedness when she stands but no syncope  Denies pregnancy  States last menses was 1 week ago  States that this is the same as prior episodes of cannabis hyperemesis syndrome  Has no relief with home antiemetic, Compazine  Prior to Admission Medications   Prescriptions Last Dose Informant Patient Reported? Taking? Lidocaine, Anorectal, (RectiCare) 5 % CREA   No Yes   Sig: Apply topically 3 (three) times a day as needed (rectal pain)   ibuprofen (MOTRIN) 600 mg tablet   No Yes   Sig: Take 1 tablet (600 mg total) by mouth every 6 (six) hours as needed for mild pain or moderate pain   ondansetron (ZOFRAN-ODT) 4 mg disintegrating tablet   No Yes   Sig: Take 1 tablet (4 mg total) by mouth every 6 (six) hours as needed for nausea or vomiting      Facility-Administered Medications: None       History reviewed  No pertinent past medical history  History reviewed  No pertinent surgical history  History reviewed  No pertinent family history  I have reviewed and agree with the history as documented      E-Cigarette/Vaping   • E-Cigarette Use Never User      E-Cigarette/Vaping Substances   • Nicotine No    • THC No    • CBD No    • Flavoring No    • Other No    • Unknown No      Social History     Tobacco Use   • Smoking status: Every Day     Packs/day: 0 25     Types: Cigarettes   • Smokeless tobacco: Never   Vaping Use   • Vaping Use: Never used   Substance Use Topics   • Alcohol use: Not Currently   • Drug use: Yes     Types: Marijuana       Review of Systems   Constitutional: Positive for activity change  Negative for fever  Respiratory: Negative for cough and shortness of breath  Cardiovascular: Positive for palpitations ( when standing, since N/V/D began)  Negative for chest pain  Gastrointestinal: Positive for abdominal pain, diarrhea, nausea and vomiting  Genitourinary: Negative for dysuria, flank pain, frequency and hematuria  Skin: Negative for rash and wound  Neurological: Positive for weakness and light-headedness  Negative for syncope and speech difficulty  All other systems reviewed and are negative  Physical Exam  Physical Exam  Vitals and nursing note reviewed  Constitutional:       General: She is in acute distress  Appearance: She is well-developed and normal weight  She is ill-appearing  She is not diaphoretic  HENT:      Head: Normocephalic and atraumatic  Mouth/Throat:      Pharynx: Oropharynx is clear  Eyes:      General: No scleral icterus  Conjunctiva/sclera: Conjunctivae normal       Pupils: Pupils are equal, round, and reactive to light  Cardiovascular:      Rate and Rhythm: Normal rate and regular rhythm  Heart sounds: Normal heart sounds  No murmur heard  Pulmonary:      Effort: Pulmonary effort is normal       Breath sounds: Normal breath sounds  Abdominal:      General: Abdomen is flat  Bowel sounds are normal  There is no distension or abdominal bruit  Palpations: Abdomen is soft  There is no fluid wave or mass  Tenderness: There is no abdominal tenderness  Hernia: No hernia is present  Musculoskeletal:         General: Normal range of motion  Cervical back: Normal range of motion and neck supple  Skin:     General: Skin is warm and dry  Capillary Refill: Capillary refill takes less than 2 seconds  Findings: No rash  Neurological:      General: No focal deficit present  Mental Status: She is alert and oriented to person, place, and time  Cranial Nerves: No cranial nerve deficit  Coordination: Coordination normal       Deep Tendon Reflexes: Reflexes are normal and symmetric  Psychiatric:         Mood and Affect: Mood is anxious           Behavior: Behavior normal          Vital Signs  ED Triage Vitals   Temperature Pulse Respirations Blood Pressure SpO2   02/25/23 0702 02/25/23 0702 02/25/23 0702 02/25/23 0702 02/25/23 0702   98 °F (36 7 °C) 73 20 165/90 98 %      Temp src Heart Rate Source Patient Position - Orthostatic VS BP Location FiO2 (%)   -- 02/25/23 0930 02/25/23 0930 02/25/23 0930 --    Monitor Lying Right arm       Pain Score       02/25/23 0930       No Pain           Vitals:    02/25/23 0945 02/25/23 1000 02/25/23 1015 02/25/23 1045   BP: 95/53 91/55 92/50 142/69   Pulse: 62 61 60 55   Patient Position - Orthostatic VS:    Lying         Visual Acuity      ED Medications  Medications   lactated ringers bolus 1,000 mL (0 mL Intravenous Stopped 2/25/23 1140)   pantoprazole (PROTONIX) injection 40 mg (40 mg Intravenous Given 2/25/23 0714)   metoclopramide (REGLAN) injection 10 mg (10 mg Intravenous Given 2/25/23 0714)   haloperidol lactate (HALDOL) injection 5 mg (5 mg Intravenous Given 2/25/23 0730)       Diagnostic Studies  Results Reviewed     Procedure Component Value Units Date/Time    Rapid drug screen, urine [[id number]]  (Abnormal) Collected: 02/25/23 0721    Lab Status: Final result Specimen: Urine, Clean Catch Updated: 02/25/23 0843     Amph/Meth UR Negative     Barbiturate Ur Negative     Benzodiazepine Urine Negative     Cocaine Urine Negative     Methadone Urine Negative     Opiate Urine Negative     PCP Ur Negative     THC Urine Positive     Oxycodone Urine Negative    Narrative:      Presumptive report  If requested, specimen will be sent to reference lab for confirmation  FOR MEDICAL PURPOSES ONLY  IF CONFIRMATION NEEDED PLEASE CONTACT THE LAB WITHIN 5 DAYS      Drug Screen Cutoff Levels:  AMPHETAMINE/METHAMPHETAMINES  1000 ng/mL  BARBITURATES     200 ng/mL  BENZODIAZEPINES     200 ng/mL  COCAINE      300 ng/mL  METHADONE      300 ng/mL  OPIATES      300 ng/mL  PHENCYCLIDINE     25 ng/mL  THC       50 ng/mL  OXYCODONE      100 ng/mL    Urine Microscopic [[id number]]  (Abnormal) Collected: 02/25/23 0721    Lab Status: Final result Specimen: Urine, Clean Catch Updated: 02/25/23 0816     RBC, UA 0-1 /hpf      WBC, UA 0-1 /hpf      Epithelial Cells Innumerable /hpf      Bacteria, UA Moderate /hpf      Amorphous Crystals, UA Moderate    Quantitative hCG [[id number]]  (Normal) Collected: 02/25/23 0721    Lab Status: Final result Specimen: Blood from Arm, Left Updated: 02/25/23 0800     HCG, Quant <1 mIU/mL     Narrative:       Expected Ranges:     Approximate               Approximate HCG  Gestation age          Concentration ( mIU/mL)  _____________          ______________________   Lavone Shepard                      HCG values  0 2-1                       5-50  1-2                           2-3                         100-5000  3-4                         500-55372  4-5                         1000-31377  5-6                         [id number]  6-8                         [id number]  8-12                        [id number]      UA (URINE) with reflex to Scope [[id number]]  (Abnormal) Collected: 02/25/23 0721    Lab Status: Final result Specimen: Urine, Clean Catch Updated: 02/25/23 0756     Color, UA Yellow     Clarity, UA Slightly Cloudy     Specific Gravity, UA >=1 030     pH, UA 6 5     Leukocytes, UA Negative     Nitrite, UA Negative     Protein,  (2+) mg/dl      Glucose, UA Negative mg/dl      Ketones,  (4+) mg/dl      Urobilinogen, UA <2 0 mg/dl      Bilirubin, UA Negative     Occult Blood, UA Negative    Comprehensive metabolic panel [[id number]]  (Abnormal) Collected: 02/25/23 0721    Lab Status: Final result Specimen: Blood from Arm, Left Updated: 02/25/23 0751     Sodium 139 mmol/L      Potassium 3 9 mmol/L      Chloride 103 mmol/L      CO2 25 mmol/L      ANION GAP 11 mmol/L      BUN 14 mg/dL      Creatinine 0 72 mg/dL      Glucose 129 mg/dL      Calcium 10 0 mg/dL      AST 19 U/L      ALT 9 U/L      Alkaline Phosphatase 33 U/L      Total Protein 9 0 g/dL      Albumin 5 1 g/dL      Total Bilirubin 1 18 mg/dL      eGFR 108 ml/min/1 73sq m     Narrative:      National [employer] guidelines for Chronic Kidney Disease (CKD):   •  Stage 1 with normal or high GFR (GFR > 90 mL/min/1 73 square meters)  •  Stage 2 Mild CKD (GFR = 60-89 mL/min/1 73 square meters)  •  Stage 3A Moderate CKD (GFR = 45-59 mL/min/1 73 square meters)  •  Stage 3B Moderate CKD (GFR = 30-44 mL/min/1 73 square meters)  •  Stage 4 Severe CKD (GFR = 15-29 mL/min/1 73 square meters)  •  Stage 5 End Stage CKD (GFR <15 mL/min/1 73 square meters)  Note: GFR calculation is accurate only with a steady state creatinine    Magnesium [[id number]]  (Normal) Collected: 02/25/23 0721    Lab Status: Final result Specimen: Blood from Arm, Left Updated: 02/25/23 0751     Magnesium 1 9 mg/dL     Lipase [[id number]]  (Normal) Collected: 02/25/23 0721    Lab Status: Final result Specimen: Blood from Arm, Left Updated: 02/25/23 0751     Lipase 15 u/L     CBC and differential [[id number]]  (Abnormal) Collected: 02/25/23 0721    Lab Status: Final result Specimen: Blood from Arm, Left Updated: 02/25/23 0734     WBC 12 46 Thousand/uL      RBC 4 42 Million/uL      Hemoglobin 14 0 g/dL      Hematocrit 41 3 %      MCV 93 fL      MCH 31 7 pg      MCHC 33 9 g/dL      RDW 11 8 %      MPV 10 9 fL      Platelets 490 Thousands/uL      nRBC 0 /100 WBCs      Neutrophils Relative 81 %      Immat GRANS % 0 %      Lymphocytes Relative 14 %      Monocytes Relative 5 %      Eosinophils Relative 0 %      Basophils Relative 0 %      Neutrophils Absolute 9 93 Thousands/µL      Immature Grans Absolute 0 03 Thousand/uL      Lymphocytes Absolute 1 78 Thousands/µL      Monocytes Absolute 0 66 Thousand/µL      Eosinophils Absolute 0 04 Thousand/µL      Basophils Absolute 0 02 Thousands/µL     POCT pregnancy, urine [[id number]]  (Normal) Resulted: 02/25/23 0722    Lab Status: Final result Updated: 02/25/23 0722     EXT Preg Test, Ur Negative     Control Valid                 No orders to display              Procedures  ECG 12 Lead Documentation Only    Date/Time: 2/25/2023 7:23 AM  Performed by: Link Membreno DO  Authorized by: Link Membreno DO     ECG reviewed by me, the ED Provider: yes    Patient location:  ED  Previous ECG:     Previous ECG:  Unavailable    Comparison to cardiac monitor: Yes    Rhythm:     Rhythm: sinus bradycardia    Ectopy:     Ectopy: none    QRS:     QRS axis:  Normal    QRS intervals:  Normal  Conduction:     Conduction: normal    ST segments:     ST segments:  Normal  T waves:     T waves: normal    Other findings:     Other findings comment:  QTc 413             ED Course  ED Course as of 02/28/23 1853   Sat Feb 25, 2023   1133 Feels much better  She is drinking and eating pretzels  Results are reassuring  Plan for discharge                               SBIRT 20yo+    Flowsheet Row Most Recent Value   SBIRT (25 yo +)    In order to provide better care to our patients, we are screening all of our patients for alcohol and drug use  Would it be okay to ask you these screening questions? Yes Filed at: 02/25/2023 0710   Initial Alcohol Screen: US AUDIT-C     1  How often do you have a drink containing alcohol? 0 Filed at: 02/25/2023 0710   2  How many drinks containing alcohol do you have on a typical day you are drinking? 0 Filed at: 02/25/2023 0710   3a  Male UNDER 65: How often do you have five or more drinks on one occasion?  0 Filed at: 02/25/2023 0710   3b  FEMALE Any Age, or MALE 65+: How often do you have 4 or more drinks on one occassion? 0 Filed at: 02/25/2023 0710   Audit-C Score 0 Filed at: 02/25/2023 0710   JONATHON: How many times in the past year have you    Used an illegal drug or used a prescription medication for non-medical reasons? Never Filed at: 02/25/2023 0710                    Medical Decision Making  41 yo F with recurrent N/V/D  Recently Dx'ed as cannabis hyperemesis syndrome  No new features  Appears dry, endorses orthostatic symptoms, no fever or other sign of acute infection  Denies other toxins  Will check lbs including UA, pregnancy test, CBC, CMP, Mg, lipase, UDS and EKG prior to ordering additional antiemetic, since she has been taking Compazine at home and therefore is at risk of prolonged QT syndrome and dysrhythmia  Results show ketonuria, acute transaminasemia THC in urine  Improved with treatment  Advised to stop cannabis consumption  Will order Reglan for her to trial instead of Compazine or Zofran at this time  Cannabis hyperemesis syndrome concurrent with and due to cannabis abuse [facility]): acute illness or injury  Ketonuria: acute illness or injury  Transaminasemia: acute illness or injury  Amount and/or Complexity of Data Reviewed  Labs: ordered  Risk  Prescription drug management            Disposition  Final diagnoses:   Cannabis hyperemesis syndrome concurrent with and due to cannabis abuse ([place] [address] )   Transaminasemia   Ketonuria     Time reflects when diagnosis was documented in both MDM as applicable and the Disposition within this note     Time User Action Codes Description Comment    2/25/2023 11:35 AM Yara Spells Add [P62 936] Cannabis hyperemesis syndrome concurrent with and due to cannabis abuse ([facility] )     2/25/2023 11:35 AM Yara Spells Add [R74 01] Transaminasemia     2/25/2023 11:35 AM Yara Spells Add [R82 4] [address]       ED Disposition     ED Disposition   Discharge Condition   Stable    Date/Time   Sat Feb 25, 2023 11:40 AM    Comment   Romina Ferraro discharge to home/self care  Follow-up Information     Follow up With Specialties Details Why Contact Info    your PCP and your gastroenterologist  Schedule an appointment as soon as possible for a visit       [employer]  Schedule an appointment as soon as possible for a visit  If you need a local -[phone]            Discharge Medication List as of 2/25/2023 12:00 PM      START taking these medications    Details   metoclopramide (Reglan) 10 mg tablet Take 1 tablet (10 mg total) by mouth every 6 (six) hours as needed (nausea and vomiting), Starting Sat 2/25/2023, Normal         CONTINUE these medications which have NOT CHANGED    Details   ibuprofen (MOTRIN) 600 mg tablet Take 1 tablet (600 mg total) by mouth every 6 (six) hours as needed for mild pain or moderate pain, Starting u 7/28/2022, Normal      Lidocaine, Anorectal, (RectiCare) 5 % CREA Apply topically 3 (three) times a day as needed (rectal pain), Starting Thu 7/28/2022, Normal      ondansetron (ZOFRAN-ODT) 4 mg disintegrating tablet Take 1 tablet (4 mg total) by mouth every 6 (six) hours as needed for nausea or vomiting, Starting Thu 7/28/2022, Normal             No discharge procedures on file      PDMP Review     None          ED Provider  Electronically Signed by           Frank Delong DO  02/28/23 5398

## 2023-02-26 ENCOUNTER — HOSPITAL ENCOUNTER (EMERGENCY)
Facility: HOSPITAL | Age: 38
Discharge: HOME | End: 2023-02-26
Attending: EMERGENCY MEDICINE | Admitting: EMERGENCY MEDICINE

## 2023-02-26 VITALS
OXYGEN SATURATION: 98 % | SYSTOLIC BLOOD PRESSURE: 131 MMHG | TEMPERATURE: 98.1 F | RESPIRATION RATE: 16 BRPM | HEART RATE: 50 BPM | DIASTOLIC BLOOD PRESSURE: 76 MMHG

## 2023-02-26 DIAGNOSIS — F12.90 CANNABINOID HYPEREMESIS SYNDROME: Primary | ICD-10-CM

## 2023-02-26 DIAGNOSIS — R11.2 CANNABINOID HYPEREMESIS SYNDROME: Primary | ICD-10-CM

## 2023-02-26 LAB
ALBUMIN SERPL BCP-MCNC: 5 G/DL (ref 3.5–5)
ALP SERPL-CCNC: 36 U/L (ref 34–104)
ALT SERPL W P-5'-P-CCNC: 9 U/L (ref 7–52)
ANION GAP SERPL CALCULATED.3IONS-SCNC: 11 MMOL/L (ref 4–13)
AST SERPL W P-5'-P-CCNC: 18 U/L (ref 13–39)
BASOPHILS # BLD AUTO: 0.03 THOUSANDS/ÂΜL (ref 0–0.1)
BASOPHILS NFR BLD AUTO: 0 % (ref 0–1)
BILIRUB SERPL-MCNC: 1.46 MG/DL (ref 0.2–1)
BUN SERPL-MCNC: 13 MG/DL (ref 5–25)
CALCIUM SERPL-MCNC: 10 MG/DL (ref 8.4–10.2)
CHLORIDE SERPL-SCNC: 101 MMOL/L (ref 96–108)
CO2 SERPL-SCNC: 27 MMOL/L (ref 21–32)
CREAT SERPL-MCNC: 0.72 MG/DL (ref 0.6–1.3)
EOSINOPHIL # BLD AUTO: 0 THOUSAND/ÂΜL (ref 0–0.61)
EOSINOPHIL NFR BLD AUTO: 0 % (ref 0–6)
ERYTHROCYTE [DISTWIDTH] IN BLOOD BY AUTOMATED COUNT: 11.4 % (ref 11.6–15.1)
GFR SERPL CREATININE-BSD FRML MDRD: 108 ML/MIN/1.73SQ M
GLUCOSE SERPL-MCNC: 112 MG/DL (ref 65–140)
HCT VFR BLD AUTO: 40.3 % (ref 34.8–46.1)
HGB BLD-MCNC: 14.1 G/DL (ref 11.5–15.4)
IMM GRANULOCYTES # BLD AUTO: 0.04 THOUSAND/UL (ref 0–0.2)
IMM GRANULOCYTES NFR BLD AUTO: 0 % (ref 0–2)
LIPASE SERPL-CCNC: 14 U/L (ref 11–82)
LYMPHOCYTES # BLD AUTO: 1.99 THOUSANDS/ÂΜL (ref 0.6–4.47)
LYMPHOCYTES NFR BLD AUTO: 22 % (ref 14–44)
MCH RBC QN AUTO: 32.3 PG (ref 26.8–34.3)
MCHC RBC AUTO-ENTMCNC: 35 G/DL (ref 31.4–37.4)
MCV RBC AUTO: 92 FL (ref 82–98)
MONOCYTES # BLD AUTO: 0.45 THOUSAND/ÂΜL (ref 0.17–1.22)
MONOCYTES NFR BLD AUTO: 5 % (ref 4–12)
NEUTROPHILS # BLD AUTO: 6.43 THOUSANDS/ÂΜL (ref 1.85–7.62)
NEUTS SEG NFR BLD AUTO: 73 % (ref 43–75)
NRBC BLD AUTO-RTO: 0 /100 WBCS
PLATELET # BLD AUTO: 289 THOUSANDS/UL (ref 149–390)
PMV BLD AUTO: 10.1 FL (ref 8.9–12.7)
POTASSIUM SERPL-SCNC: 3.3 MMOL/L (ref 3.5–5.3)
PROT SERPL-MCNC: 8.3 G/DL (ref 6.4–8.4)
RBC # BLD AUTO: 4.36 MILLION/UL (ref 3.81–5.12)
SODIUM SERPL-SCNC: 139 MMOL/L (ref 135–147)
WBC # BLD AUTO: 8.94 THOUSAND/UL (ref 4.31–10.16)

## 2023-02-26 RX ORDER — HALOPERIDOL 5 MG/ML
2 INJECTION INTRAMUSCULAR ONCE
Status: COMPLETED | OUTPATIENT
Start: 2023-02-26 | End: 2023-02-26

## 2023-02-26 RX ORDER — POTASSIUM CHLORIDE 20MEQ/15ML
20 LIQUID (ML) ORAL ONCE
Status: DISCONTINUED | OUTPATIENT
Start: 2023-02-26 | End: 2023-02-26 | Stop reason: HOSPADM

## 2023-02-26 RX ORDER — POTASSIUM CHLORIDE 20 MEQ/1
40 TABLET, EXTENDED RELEASE ORAL ONCE
Status: DISCONTINUED | OUTPATIENT
Start: 2023-02-26 | End: 2023-02-26

## 2023-02-26 RX ORDER — ONDANSETRON 2 MG/ML
4 INJECTION INTRAMUSCULAR; INTRAVENOUS ONCE
Status: COMPLETED | OUTPATIENT
Start: 2023-02-26 | End: 2023-02-26

## 2023-02-26 RX ORDER — PANTOPRAZOLE SODIUM 40 MG/10ML
40 INJECTION, POWDER, LYOPHILIZED, FOR SOLUTION INTRAVENOUS ONCE
Status: COMPLETED | OUTPATIENT
Start: 2023-02-26 | End: 2023-02-26

## 2023-02-26 RX ADMIN — ONDANSETRON 4 MG: 2 INJECTION INTRAMUSCULAR; INTRAVENOUS at 19:55

## 2023-02-26 RX ADMIN — PANTOPRAZOLE SODIUM 40 MG: 40 INJECTION, POWDER, FOR SOLUTION INTRAVENOUS at 17:36

## 2023-02-26 RX ADMIN — SODIUM CHLORIDE 1000 ML: 0.9 INJECTION, SOLUTION INTRAVENOUS at 17:35

## 2023-02-26 RX ADMIN — HALOPERIDOL LACTATE 2 MG: 5 INJECTION, SOLUTION INTRAMUSCULAR at 17:36

## 2023-02-26 NOTE — ED PROVIDER NOTES
History  Chief Complaint   Patient presents with   • Vomiting     Pt says she keeps vomitting and cant stop     Patient is a 39 y/o F with h/o cannibinoid hyperemesis that presents to the ED with nausea and vomiting that started 2 days ago  She was seen in the ER yesterday, had labs, IV fluids, negative pregnancy test, haldol and felt better  Patient returns today with vomiting  She denies abdominal pain, but states she has reflux  She denies sick contacts, foreign travel, bad food exposure  History provided by:  Patient  Vomiting  Severity:  Moderate  Duration:  2 days  Timing:  Constant  Progression:  Unchanged  Chronicity:  Recurrent  Relieved by:  Nothing  Worsened by:  Liquids  Ineffective treatments:  None tried  Associated symptoms: no abdominal pain, no chills, no fever, no sore throat and no URI    Risk factors: no sick contacts, no suspect food intake and no travel to endemic areas        Prior to Admission Medications   Prescriptions Last Dose Informant Patient Reported? Taking? Lidocaine, Anorectal, (RectiCare) 5 % CREA   No No   Sig: Apply topically 3 (three) times a day as needed (rectal pain)   ibuprofen (MOTRIN) 600 mg tablet   No No   Sig: Take 1 tablet (600 mg total) by mouth every 6 (six) hours as needed for mild pain or moderate pain   metoclopramide (Reglan) 10 mg tablet   No No   Sig: Take 1 tablet (10 mg total) by mouth every 6 (six) hours as needed (nausea and vomiting)   ondansetron (ZOFRAN-ODT) 4 mg disintegrating tablet   No No   Sig: Take 1 tablet (4 mg total) by mouth every 6 (six) hours as needed for nausea or vomiting      Facility-Administered Medications: None       History reviewed  No pertinent past medical history  History reviewed  No pertinent surgical history  History reviewed  No pertinent family history  I have reviewed and agree with the history as documented      E-Cigarette/Vaping   • E-Cigarette Use Never User      E-Cigarette/Vaping Substances   • Nicotine No    • THC No    • CBD No    • Flavoring No    • Other No    • Unknown No      Social History     Tobacco Use   • Smoking status: Every Day     Packs/day: 0 25     Types: Cigarettes   • Smokeless tobacco: Never   Vaping Use   • Vaping Use: Never used   Substance Use Topics   • Alcohol use: Not Currently   • Drug use: Yes     Types: Marijuana       Review of Systems   Constitutional: Negative for chills and fever  HENT: Negative for congestion and sore throat  Gastrointestinal: Positive for nausea and vomiting  Negative for abdominal pain  Genitourinary: Negative for dysuria  Skin: Negative for color change, pallor and rash  Neurological: Negative for dizziness, weakness, light-headedness and numbness  Psychiatric/Behavioral: Negative for confusion  All other systems reviewed and are negative  Physical Exam  Physical Exam  Vitals and nursing note reviewed  Constitutional:       General: She is not in acute distress  Appearance: Normal appearance  She is well-developed, well-groomed and normal weight  She is not ill-appearing or diaphoretic  Comments: Patient tearful, yelling in room that she needs medications  HENT:      Head: Normocephalic and atraumatic  Right Ear: External ear normal       Left Ear: External ear normal       Nose: Nose normal       Mouth/Throat:      Mouth: Mucous membranes are dry  Eyes:      Conjunctiva/sclera: Conjunctivae normal       Pupils: Pupils are equal    Cardiovascular:      Rate and Rhythm: Normal rate and regular rhythm  Heart sounds: Normal heart sounds  Pulmonary:      Effort: Pulmonary effort is normal       Breath sounds: Normal breath sounds  No wheezing, rhonchi or rales  Abdominal:      General: Abdomen is flat  Bowel sounds are normal       Palpations: Abdomen is soft  Tenderness: There is no abdominal tenderness  Musculoskeletal:         General: Normal range of motion        Cervical back: Normal range of motion and neck supple  Skin:     General: Skin is warm and dry  Coloration: Skin is not jaundiced or pale  Findings: No rash  Neurological:      General: No focal deficit present  Mental Status: She is alert and oriented to person, place, and time  Motor: No weakness  Psychiatric:         Mood and Affect: Mood is anxious  Behavior: Behavior is cooperative           Vital Signs  ED Triage Vitals   Temperature Pulse Respirations Blood Pressure SpO2   02/26/23 1639 02/26/23 1632 02/26/23 1632 02/26/23 1632 02/26/23 1632   98 1 °F (36 7 °C) 70 18 145/81 98 %      Temp Source Heart Rate Source Patient Position - Orthostatic VS BP Location FiO2 (%)   02/26/23 1639 02/26/23 1745 02/26/23 1745 02/26/23 1745 --   Temporal Monitor Lying Right arm       Pain Score       --                  Vitals:    02/26/23 1632 02/26/23 1745 02/26/23 1900 02/26/23 1930   BP: 145/81 123/64 128/71 131/76   Pulse: 70 (!) 49 (!) 49 (!) 50   Patient Position - Orthostatic VS:  Lying Lying          Visual Acuity      ED Medications  Medications   potassium chloride oral solution 20 mEq (20 mEq Oral Not Given 2/26/23 1824)   sodium chloride 0 9 % bolus 1,000 mL (0 mL Intravenous Stopped 2/26/23 1835)   haloperidol lactate (HALDOL) injection 2 mg (2 mg Intramuscular Given 2/26/23 1736)   pantoprazole (PROTONIX) injection 40 mg (40 mg Intravenous Given 2/26/23 1736)   ondansetron (ZOFRAN) injection 4 mg (4 mg Intravenous Given 2/26/23 1955)       Diagnostic Studies  Results Reviewed     Procedure Component Value Units Date/Time    POCT pregnancy, urine [[id number]]     Lab Status: No result     Comprehensive metabolic panel [[id number]]  (Abnormal) Collected: 02/26/23 1640    Lab Status: Final result Specimen: Blood from Arm, Right Updated: 02/26/23 1703     Sodium 139 mmol/L      Potassium 3 3 mmol/L      Chloride 101 mmol/L      CO2 27 mmol/L      ANION GAP 11 mmol/L      BUN 13 mg/dL      Creatinine 0 72 mg/dL Glucose 112 mg/dL      Calcium 10 0 mg/dL      AST 18 U/L      ALT 9 U/L      Alkaline Phosphatase 36 U/L      Total Protein 8 3 g/dL      Albumin 5 0 g/dL      Total Bilirubin 1 46 mg/dL      eGFR 108 ml/min/1 73sq m     Narrative:      [place] guidelines for Chronic Kidney Disease (CKD):   •  Stage 1 with normal or high GFR (GFR > 90 mL/min/1 73 square meters)  •  Stage 2 Mild CKD (GFR = 60-89 mL/min/1 73 square meters)  •  Stage 3A Moderate CKD (GFR = 45-59 mL/min/1 73 square meters)  •  Stage 3B Moderate CKD (GFR = 30-44 mL/min/1 73 square meters)  •  Stage 4 Severe CKD (GFR = 15-29 mL/min/1 73 square meters)  •  Stage 5 End Stage CKD (GFR <15 mL/min/1 73 square meters)  Note: GFR calculation is accurate only with a steady state creatinine    Lipase [[id number]]  (Normal) Collected: 02/26/23 1640    Lab Status: Final result Specimen: Blood from Arm, Right Updated: 02/26/23 1703     Lipase 14 u/L     CBC and differential [[id number]]  (Abnormal) Collected: 02/26/23 1640    Lab Status: Final result Specimen: Blood from Arm, Right Updated: 02/26/23 1644     WBC 8 94 Thousand/uL      RBC 4 36 Million/uL      Hemoglobin 14 1 g/dL      Hematocrit 40 3 %      MCV 92 fL      MCH 32 3 pg      MCHC 35 0 g/dL      RDW 11 4 %      MPV 10 1 fL      Platelets 345 Thousands/uL      nRBC 0 /100 WBCs      Neutrophils Relative 73 %      Immat GRANS % 0 %      Lymphocytes Relative 22 %      Monocytes Relative 5 %      Eosinophils Relative 0 %      Basophils Relative 0 %      Neutrophils Absolute 6 43 Thousands/µL      Immature Grans Absolute 0 04 Thousand/uL      Lymphocytes Absolute 1 99 Thousands/µL      Monocytes Absolute 0 45 Thousand/µL      Eosinophils Absolute 0 00 Thousand/µL      Basophils Absolute 0 03 Thousands/µL     UA w Reflex to Microscopic w Reflex to Culture [[id number]]     Lab Status: No result Specimen: Urine                  No orders to display              Procedures  Procedures ED Course  ED Course as of 02/26/23 2100   Ludivina Isidro Feb 26, 2023   1807 Patient has not vomited since coming to the ED    Ct[facility]  [address] [address] transferred to DR Willian Arnold  Medical Decision Making  Patient with cannabinoid hyperemesis, h/o similar episodes multiple times in the past, will check labs to r/o dehydration, electrolyte abnormality, give IV fluids and haldol and reassess  Patient did not vomit after haldol, able to tolerate po  Instructed patient to avoid marijuana  Patient given oral potassium for mild hypokalemia  Reviewed ER note from yesterday, patient was seen in the ER for the same thing yesterday and improved with haldol  Cannabinoid hyperemesis syndrome: acute illness or injury  Amount and/or Complexity of Data Reviewed  External Data Reviewed: notes  Labs: ordered  Risk  Prescription drug management  Disposition  Final diagnoses:   Cannabinoid hyperemesis syndrome     Time reflects when diagnosis was documented in both MDM as applicable and the Disposition within this note     Time User Action Codes Description Comment    2/26/2023  5:31 PM Smitha Mosley Add [H36 0,  F12 90] Cannabinoid hyperemesis syndrome       ED Disposition     ED Disposition   Discharge    Condition   Stable    Date/Time   Sun Feb 26, 2023  7:46 PM    Comment   Romina Ferraro discharge to home/self care                 Follow-up Information     Follow up With Specialties Details Why Contact Info Additional Information    Your primary care provider          Chele Yadav [facility]  If symptoms worsen [address] [place],[address] [id number]  [address] [facility], [address] [place] [address] , Enedina Fernandez [address]          Discharge Medication List as of 2/26/2023  7:46 PM      CONTINUE these medications which have NOT CHANGED    Details   ibuprofen (MOTRIN) 600 mg tablet Take 1 tablet (600 mg total) by mouth every 6 (six) hours as needed for mild pain or moderate pain, Starting Thu 7/28/2022, Normal      Lidocaine, Anorectal, (RectiCare) 5 % CREA Apply topically 3 (three) times a day as needed (rectal pain), Starting Thu 7/28/2022, Normal      metoclopramide (Reglan) 10 mg tablet Take 1 tablet (10 mg total) by mouth every 6 (six) hours as needed (nausea and vomiting), Starting Sat 2/25/2023, Normal      ondansetron (ZOFRAN-ODT) 4 mg disintegrating tablet Take 1 tablet (4 mg total) by mouth every 6 (six) hours as needed for nausea or vomiting, Starting Thu 7/28/2022, Normal             No discharge procedures on file      PDMP Review     None          ED Provider  Electronically Signed by           Bandar Simental PA-C  02/26/23 2100

## 2023-02-26 NOTE — ED NOTES
Pt called to triage room and asked to sit into large brown recliner  Pt sat in recliner stating "please give me medicine, please take me to a room"  This nurse explained that I was going to triage her and see what was available for room  Pt sat in recliner dry heaving into blue emesis bag  Pt states "I am throwing up cant you see" Pt then threw up clear liquid on to triage room floor while holding blue emesis bag in hand  Pt continued to demand that I bring her to a room  Pt was reassured that there would be rooms shortly and that I was going to draw her blood and get her care started  This nurse julio patients blood with out difficulty  Pt continued to dry heavy into blue emesis bag  This nurse provided her with a paper towel for her to wipe her mouth  Pt continued to state " I need to come back, I m vomiting"  Pt then threw herself onto the floor crying stating she needed a room and couldn't go back out there  Pt started yelling at this nurse " you guys dont do anything here for me"  When asked for the patient to please stand up and sit back in the chair pt continue to violently yell at this nurse obscenities  Pt yelling at nurse she wanted to speak to the supervisor  Patients yelling was so loud that additional staff and security could hear that patient was escalating and felt this nurse was in an unsafe situation  Pt continued demand to speak to supervisor because she needed a room  Pt placed in a wheelchair by additional staff and brought to the waiting room       Elizabeth Walker RN  02/26/23 7008

## 2023-02-27 NOTE — ED ATTENDING ATTESTATION
2/26/2023  Nancy Nelson DO, saw and evaluated the patient  I have discussed the patient with the resident/non-physician practitioner and agree with the resident's/non-physician practitioner's findings, Plan of Care, and MDM as documented in the resident's/non-physician practitioner's note, except where noted  All available labs and Radiology studies were reviewed  I was present for key portions of any procedure(s) performed by the resident/non-physician practitioner and I was immediately available to provide assistance  At this point I agree with the current assessment done in the Emergency Department  I have conducted an independent evaluation of this patient a history and physical is as follows:    ED Course       Patient has not vomited while in ED for >3 hours  Abdomen soft, NT  Denies abdominal pain  Reviewed all results with patient  Advised to FU with PCP  Denies headache, fever, urinary symptoms  Neuro exam intact  Normal ambulation   Pupils equal      Critical Care Time  Procedures

## 2023-02-28 LAB
ATRIAL RATE: 49 BPM
P AXIS: 56 DEGREES
PR INTERVAL: 120 MS
QRS AXIS: 65 DEGREES
QRSD INTERVAL: 86 MS
QT INTERVAL: 458 MS
QTC INTERVAL: 413 MS
T WAVE AXIS: 56 DEGREES
VENTRICULAR RATE: 49 BPM

## 2023-05-20 RX ORDER — ONDANSETRON 4 MG/1
4 TABLET, ORALLY DISINTEGRATING ORAL ONCE
Status: COMPLETED | OUTPATIENT
Start: 2023-05-21 | End: 2023-05-20

## 2023-05-20 RX ADMIN — ONDANSETRON 4 MG: 4 TABLET, ORALLY DISINTEGRATING ORAL at 23:47

## 2023-05-21 ENCOUNTER — HOSPITAL ENCOUNTER (EMERGENCY)
Facility: HOSPITAL | Age: 38
Discharge: HOME | End: 2023-05-21
Attending: EMERGENCY MEDICINE

## 2023-05-21 ENCOUNTER — APPOINTMENT (EMERGENCY)
Dept: CT IMAGING | Facility: HOSPITAL | Age: 38
End: 2023-05-21

## 2023-05-21 VITALS
OXYGEN SATURATION: 95 % | RESPIRATION RATE: 18 BRPM | TEMPERATURE: 98.1 F | HEART RATE: 64 BPM | SYSTOLIC BLOOD PRESSURE: 112 MMHG | HEIGHT: 66 IN | BODY MASS INDEX: 24.11 KG/M2 | DIASTOLIC BLOOD PRESSURE: 67 MMHG | WEIGHT: 150 LBS

## 2023-05-21 DIAGNOSIS — R10.9 ABDOMINAL PAIN: ICD-10-CM

## 2023-05-21 DIAGNOSIS — R11.2 NAUSEA AND VOMITING: Primary | ICD-10-CM

## 2023-05-21 DIAGNOSIS — K76.9 LIVER LESION: ICD-10-CM

## 2023-05-21 DIAGNOSIS — E87.6 HYPOKALEMIA: ICD-10-CM

## 2023-05-21 DIAGNOSIS — K04.7 DENTAL INFECTION: ICD-10-CM

## 2023-05-21 LAB
ALBUMIN SERPL BCP-MCNC: 4.7 G/DL (ref 3.5–5)
ALP SERPL-CCNC: 31 U/L (ref 34–104)
ALT SERPL W P-5'-P-CCNC: 6 U/L (ref 7–52)
ANION GAP SERPL CALCULATED.3IONS-SCNC: 11 MMOL/L (ref 4–13)
ANION GAP SERPL CALCULATED.3IONS-SCNC: 8 MMOL/L (ref 4–13)
AST SERPL W P-5'-P-CCNC: 15 U/L (ref 13–39)
BACTERIA UR QL AUTO: ABNORMAL /HPF
BASOPHILS # BLD AUTO: 0.07 THOUSANDS/ÂΜL (ref 0–0.1)
BASOPHILS NFR BLD AUTO: 1 % (ref 0–1)
BILIRUB SERPL-MCNC: 1.21 MG/DL (ref 0.2–1)
BILIRUB UR QL STRIP: NEGATIVE
BUN SERPL-MCNC: 10 MG/DL (ref 5–25)
BUN SERPL-MCNC: 11 MG/DL (ref 5–25)
CALCIUM SERPL-MCNC: 8.5 MG/DL (ref 8.4–10.2)
CALCIUM SERPL-MCNC: 9.5 MG/DL (ref 8.4–10.2)
CHLORIDE SERPL-SCNC: 101 MMOL/L (ref 96–108)
CHLORIDE SERPL-SCNC: 96 MMOL/L (ref 96–108)
CLARITY UR: CLEAR
CO2 SERPL-SCNC: 29 MMOL/L (ref 21–32)
CO2 SERPL-SCNC: 32 MMOL/L (ref 21–32)
COLOR UR: YELLOW
CREAT SERPL-MCNC: 0.67 MG/DL (ref 0.6–1.3)
CREAT SERPL-MCNC: 0.71 MG/DL (ref 0.6–1.3)
EOSINOPHIL # BLD AUTO: 0.01 THOUSAND/ÂΜL (ref 0–0.61)
EOSINOPHIL NFR BLD AUTO: 0 % (ref 0–6)
ERYTHROCYTE [DISTWIDTH] IN BLOOD BY AUTOMATED COUNT: 11.5 % (ref 11.6–15.1)
EXT PREGNANCY TEST URINE: NEGATIVE
EXT. CONTROL: NORMAL
GFR SERPL CREATININE-BSD FRML MDRD: 109 ML/MIN/1.73SQ M
GFR SERPL CREATININE-BSD FRML MDRD: 113 ML/MIN/1.73SQ M
GLUCOSE SERPL-MCNC: 105 MG/DL (ref 65–140)
GLUCOSE SERPL-MCNC: 130 MG/DL (ref 65–140)
GLUCOSE UR STRIP-MCNC: NEGATIVE MG/DL
HCT VFR BLD AUTO: 38.8 % (ref 34.8–46.1)
HGB BLD-MCNC: 13.5 G/DL (ref 11.5–15.4)
HGB UR QL STRIP.AUTO: ABNORMAL
IMM GRANULOCYTES # BLD AUTO: 0.05 THOUSAND/UL (ref 0–0.2)
IMM GRANULOCYTES NFR BLD AUTO: 1 % (ref 0–2)
KETONES UR STRIP-MCNC: ABNORMAL MG/DL
LEUKOCYTE ESTERASE UR QL STRIP: NEGATIVE
LIPASE SERPL-CCNC: 20 U/L (ref 11–82)
LYMPHOCYTES # BLD AUTO: 2.29 THOUSANDS/ÂΜL (ref 0.6–4.47)
LYMPHOCYTES NFR BLD AUTO: 22 % (ref 14–44)
MCH RBC QN AUTO: 32.6 PG (ref 26.8–34.3)
MCHC RBC AUTO-ENTMCNC: 34.8 G/DL (ref 31.4–37.4)
MCV RBC AUTO: 94 FL (ref 82–98)
MONOCYTES # BLD AUTO: 0.84 THOUSAND/ÂΜL (ref 0.17–1.22)
MONOCYTES NFR BLD AUTO: 8 % (ref 4–12)
MUCOUS THREADS UR QL AUTO: ABNORMAL
NEUTROPHILS # BLD AUTO: 6.98 THOUSANDS/ÂΜL (ref 1.85–7.62)
NEUTS SEG NFR BLD AUTO: 68 % (ref 43–75)
NITRITE UR QL STRIP: NEGATIVE
NON-SQ EPI CELLS URNS QL MICRO: ABNORMAL /HPF
NRBC BLD AUTO-RTO: 0 /100 WBCS
PH UR STRIP.AUTO: 8.5 [PH]
PLATELET # BLD AUTO: 289 THOUSANDS/UL (ref 149–390)
PMV BLD AUTO: 10 FL (ref 8.9–12.7)
POTASSIUM SERPL-SCNC: 2.5 MMOL/L (ref 3.5–5.3)
POTASSIUM SERPL-SCNC: 3.2 MMOL/L (ref 3.5–5.3)
PROT SERPL-MCNC: 8.1 G/DL (ref 6.4–8.4)
PROT UR STRIP-MCNC: ABNORMAL MG/DL
RBC # BLD AUTO: 4.14 MILLION/UL (ref 3.81–5.12)
RBC #/AREA URNS AUTO: ABNORMAL /HPF
SODIUM SERPL-SCNC: 138 MMOL/L (ref 135–147)
SODIUM SERPL-SCNC: 139 MMOL/L (ref 135–147)
SP GR UR STRIP.AUTO: 1.01 (ref 1–1.03)
UROBILINOGEN UR STRIP-ACNC: 4 MG/DL
WBC # BLD AUTO: 10.24 THOUSAND/UL (ref 4.31–10.16)
WBC #/AREA URNS AUTO: ABNORMAL /HPF

## 2023-05-21 RX ORDER — ONDANSETRON 4 MG/1
4 TABLET, ORALLY DISINTEGRATING ORAL EVERY 6 HOURS PRN
Qty: 20 TABLET | Refills: 0 | Status: SHIPPED | OUTPATIENT
Start: 2023-05-21

## 2023-05-21 RX ORDER — PANTOPRAZOLE SODIUM 40 MG/10ML
40 INJECTION, POWDER, LYOPHILIZED, FOR SOLUTION INTRAVENOUS ONCE
Status: COMPLETED | OUTPATIENT
Start: 2023-05-21 | End: 2023-05-21

## 2023-05-21 RX ORDER — POTASSIUM CHLORIDE 20 MEQ/1
20 TABLET, EXTENDED RELEASE ORAL DAILY
Qty: 7 TABLET | Refills: 0 | Status: SHIPPED | OUTPATIENT
Start: 2023-05-21 | End: 2023-05-21 | Stop reason: SDUPTHER

## 2023-05-21 RX ORDER — POTASSIUM CHLORIDE 14.9 MG/ML
20 INJECTION INTRAVENOUS ONCE
Status: COMPLETED | OUTPATIENT
Start: 2023-05-21 | End: 2023-05-21

## 2023-05-21 RX ORDER — PENICILLIN V POTASSIUM 250 MG/1
500 TABLET ORAL ONCE
Status: COMPLETED | OUTPATIENT
Start: 2023-05-21 | End: 2023-05-21

## 2023-05-21 RX ORDER — ONDANSETRON 4 MG/1
4 TABLET, ORALLY DISINTEGRATING ORAL EVERY 6 HOURS PRN
Qty: 20 TABLET | Refills: 0 | Status: SHIPPED | OUTPATIENT
Start: 2023-05-21 | End: 2023-05-21 | Stop reason: SDUPTHER

## 2023-05-21 RX ORDER — POTASSIUM CHLORIDE 20 MEQ/1
20 TABLET, EXTENDED RELEASE ORAL DAILY
Qty: 7 TABLET | Refills: 0 | Status: SHIPPED | OUTPATIENT
Start: 2023-05-21 | End: 2023-05-28

## 2023-05-21 RX ORDER — PENICILLIN V POTASSIUM 500 MG/1
500 TABLET ORAL 4 TIMES DAILY
Qty: 28 TABLET | Refills: 0 | Status: SHIPPED | OUTPATIENT
Start: 2023-05-21 | End: 2023-05-28

## 2023-05-21 RX ORDER — MAGNESIUM SULFATE HEPTAHYDRATE 40 MG/ML
2 INJECTION, SOLUTION INTRAVENOUS ONCE
Status: COMPLETED | OUTPATIENT
Start: 2023-05-21 | End: 2023-05-21

## 2023-05-21 RX ORDER — POTASSIUM CHLORIDE 20 MEQ/1
40 TABLET, EXTENDED RELEASE ORAL ONCE
Status: COMPLETED | OUTPATIENT
Start: 2023-05-21 | End: 2023-05-21

## 2023-05-21 RX ORDER — PENICILLIN V POTASSIUM 500 MG/1
500 TABLET ORAL 4 TIMES DAILY
Qty: 28 TABLET | Refills: 0 | Status: SHIPPED | OUTPATIENT
Start: 2023-05-21 | End: 2023-05-21 | Stop reason: SDUPTHER

## 2023-05-21 RX ADMIN — POTASSIUM CHLORIDE 20 MEQ: 14.9 INJECTION, SOLUTION INTRAVENOUS at 03:09

## 2023-05-21 RX ADMIN — POTASSIUM CHLORIDE 40 MEQ: 1500 TABLET, EXTENDED RELEASE ORAL at 03:08

## 2023-05-21 RX ADMIN — MAGNESIUM SULFATE HEPTAHYDRATE 2 G: 2 INJECTION, SOLUTION INTRAVENOUS at 03:09

## 2023-05-21 RX ADMIN — IOHEXOL 100 ML: 350 INJECTION, SOLUTION INTRAVENOUS at 03:28

## 2023-05-21 RX ADMIN — PANTOPRAZOLE SODIUM 40 MG: 40 INJECTION, POWDER, FOR SOLUTION INTRAVENOUS at 02:34

## 2023-05-21 RX ADMIN — PENICILLIN V POTASSIUM 500 MG: 250 TABLET, FILM COATED ORAL at 05:10

## 2023-05-21 RX ADMIN — SODIUM CHLORIDE 1000 ML: 0.9 INJECTION, SOLUTION INTRAVENOUS at 02:34

## 2023-05-21 NOTE — DISCHARGE INSTRUCTIONS
You had a lesion noted in your liver on the CAT scan performed today, this will need to be further evaluated by an MRI with and without contrast, follow-up with PCP or GI for further evaluation

## 2023-05-21 NOTE — ED NOTES
RN walked into pt room and pt was sleeping   Pt was not in any distress     Ac Gorman, CARMELITA  05/21/23 9127

## 2023-05-21 NOTE — ED PROVIDER NOTES
History  Chief Complaint   Patient presents with   • Vomiting     X2 days, no diarrhea/fever, abdominal pain as well; just started period today      29-year-old female with no previous medical problems presents for evaluation of cute onset nausea vomiting and crampy lower abdominal pain that started 2 days ago  Similar to previous episodes for which she was seen in the emergency department, possibly in setting of cannabinoid hyperemesis  States that typically read from [place] causes the symptoms for her  Also states that she just started her menstrual period which is abnormal as she just had her period last week  Not on any birth control  No current abdominal pain, no current nausea  Prior to Admission Medications   Prescriptions Last Dose Informant Patient Reported? Taking? Lidocaine, Anorectal, (RectiCare) 5 % CREA   No No   Sig: Apply topically 3 (three) times a day as needed (rectal pain)   ibuprofen (MOTRIN) 600 mg tablet   No No   Sig: Take 1 tablet (600 mg total) by mouth every 6 (six) hours as needed for mild pain or moderate pain   metoclopramide (Reglan) 10 mg tablet   No No   Sig: Take 1 tablet (10 mg total) by mouth every 6 (six) hours as needed (nausea and vomiting)   ondansetron (ZOFRAN-ODT) 4 mg disintegrating tablet   No No   Sig: Take 1 tablet (4 mg total) by mouth every 6 (six) hours as needed for nausea or vomiting      Facility-Administered Medications: None       History reviewed  No pertinent past medical history  History reviewed  No pertinent surgical history  History reviewed  No pertinent family history  I have reviewed and agree with the history as documented      E-Cigarette/Vaping   • E-Cigarette Use Current Every Day User      E-Cigarette/Vaping Substances   • Nicotine No    • THC No    • CBD No    • Flavoring No    • Other No    • Unknown No      Social History     Tobacco Use   • Smoking status: Former     Packs/day: 0 25     Types: Cigarettes   • Smokeless tobacco: Never   Vaping Use   • Vaping Use: Every day   Substance Use Topics   • Alcohol use: Not Currently   • Drug use: Yes     Types: Marijuana       Review of Systems   Constitutional: Negative for appetite change and fever  HENT: Negative for rhinorrhea and sore throat  Eyes: Negative for photophobia and visual disturbance  Respiratory: Negative for cough, chest tightness and wheezing  Cardiovascular: Negative for chest pain, palpitations and leg swelling  Gastrointestinal: Positive for abdominal pain, nausea and vomiting  Negative for abdominal distention, blood in stool, constipation and diarrhea  Genitourinary: Negative for dysuria, flank pain, frequency, hematuria and urgency  Musculoskeletal: Negative for back pain  Skin: Negative for rash  Neurological: Negative for dizziness, weakness and headaches  All other systems reviewed and are negative  Physical Exam  Physical Exam  Vitals and nursing note reviewed  Constitutional:       Appearance: She is well-developed  HENT:      Head: Normocephalic and atraumatic  Mouth/Throat:     Eyes:      Pupils: Pupils are equal, round, and reactive to light  Cardiovascular:      Rate and Rhythm: Normal rate and regular rhythm  Heart sounds: No murmur heard  No friction rub  No gallop  Pulmonary:      Effort: Pulmonary effort is normal       Breath sounds: No wheezing or rales  Chest:      Chest wall: No tenderness  Abdominal:      General: There is no distension  Palpations: Abdomen is soft  There is no mass  Tenderness: There is no abdominal tenderness  There is no guarding or rebound  Musculoskeletal:      Cervical back: Normal range of motion and neck supple  Skin:     General: Skin is warm and dry  Neurological:      Mental Status: She is alert and oriented to person, place, and time           Vital Signs  ED Triage Vitals [05/20/23 2342]   Temperature Pulse Respirations Blood Pressure SpO2   98 1 °F (36 7 °C) 64 18 141/88 100 %      Temp Source Heart Rate Source Patient Position - Orthostatic VS BP Location FiO2 (%)   Oral Monitor Sitting Right arm --      Pain Score       8           Vitals:    05/20/23 2342 05/21/23 0030 05/21/23 0245 05/21/23 0300   BP: 141/88 145/77 110/64 112/67   Pulse: 64 55 62 64   Patient Position - Orthostatic VS: Sitting            Visual Acuity      ED Medications  Medications   ondansetron (ZOFRAN-ODT) dispersible tablet 4 mg (4 mg Oral Given 5/20/23 2347)   sodium chloride 0 9 % bolus 1,000 mL (0 mL Intravenous Stopped 5/21/23 0511)   pantoprazole (PROTONIX) injection 40 mg (40 mg Intravenous Given 5/21/23 0234)   potassium chloride (K-DUR,KLOR-CON) CR tablet 40 mEq (40 mEq Oral Given 5/21/23 0308)   potassium chloride 20 mEq IVPB (premix) (0 mEq Intravenous Stopped 5/21/23 0511)   magnesium sulfate 2 g/50 mL IVPB (premix) 2 g (0 g Intravenous Stopped 5/21/23 0339)   iohexol (OMNIPAQUE) 350 MG/ML injection (SINGLE-DOSE) 100 mL (100 mL Intravenous Given 5/21/23 0328)   penicillin V potassium (VEETID) tablet 500 mg (500 mg Oral Given 5/21/23 0510)       Diagnostic Studies  Results Reviewed     Procedure Component Value Units Date/Time    Basic metabolic panel [[id number]]  (Abnormal) Collected: 05/21/23 0535    Lab Status: Final result Specimen: Blood from Arm, Right Updated: 05/21/23 0541     Sodium 138 mmol/L      Potassium 3 2 mmol/L      Chloride 101 mmol/L      CO2 29 mmol/L      ANION GAP 8 mmol/L      BUN 10 mg/dL      Creatinine 0 67 mg/dL      Glucose 105 mg/dL      Calcium 8 5 mg/dL      eGFR 113 ml/min/1 73sq m     Narrative:      [place] guidelines for Chronic Kidney Disease (CKD):   •  Stage 1 with normal or high GFR (GFR > 90 mL/min/1 73 square meters)  •  Stage 2 Mild CKD (GFR = 60-89 mL/min/1 73 square meters)  •  Stage 3A Moderate CKD (GFR = 45-59 mL/min/1 73 square meters)  •  Stage 3B Moderate CKD (GFR = 30-44 mL/min/1 73 square meters)  •  Stage 4 Severe CKD (GFR = 15-29 mL/min/1 73 square meters)  •  Stage 5 End Stage CKD (GFR <15 mL/min/1 73 square meters)  Note: GFR calculation is accurate only with a steady state creatinine    Comprehensive metabolic panel [[id number]]  (Abnormal) Collected: 05/21/23 0147    Lab Status: Final result Specimen: Blood from Arm, Right Updated: 05/21/23 0240     Sodium 139 mmol/L      Potassium 2 5 mmol/L      Chloride 96 mmol/L      CO2 32 mmol/L      ANION GAP 11 mmol/L      BUN 11 mg/dL      Creatinine 0 71 mg/dL      Glucose 130 mg/dL      Calcium 9 5 mg/dL      AST 15 U/L      ALT 6 U/L      Alkaline Phosphatase 31 U/L      Total Protein 8 1 g/dL      Albumin 4 7 g/dL      Total Bilirubin 1 21 mg/dL      eGFR 109 ml/min/1 73sq m     Narrative:      National Kidney Disease Foundation guidelines for Chronic Kidney Disease (CKD):   •  Stage 1 with normal or high GFR (GFR > 90 mL/min/1 73 square meters)  •  Stage 2 Mild CKD (GFR = 60-89 mL/min/1 73 square meters)  •  Stage 3A Moderate CKD (GFR = 45-59 mL/min/1 73 square meters)  •  Stage 3B Moderate CKD (GFR = 30-44 mL/min/1 73 square meters)  •  Stage 4 Severe CKD (GFR = 15-29 mL/min/1 73 square meters)  •  Stage 5 End Stage CKD (GFR <15 mL/min/1 73 square meters)  Note: GFR calculation is accurate only with a steady state creatinine    Lipase [[id number]]  (Normal) Collected: 05/21/23 0147    Lab Status: Final result Specimen: Blood from Arm, Right Updated: 05/21/23 0233     Lipase 20 u/L     CBC and differential [[id number]]  (Abnormal) Collected: 05/21/23 0147    Lab Status: Final result Specimen: Blood from Arm, Right Updated: 05/21/23 0155     WBC 10 24 Thousand/uL      RBC 4 14 Million/uL      Hemoglobin 13 5 g/dL      Hematocrit 38 8 %      MCV 94 fL      MCH 32 6 pg      MCHC 34 8 g/dL      RDW 11 5 %      MPV 10 0 fL      Platelets 993 Thousands/uL      nRBC 0 /100 WBCs      Neutrophils Relative 68 %      Immat GRANS % 1 %      Lymphocytes Relative 22 %      Monocytes Relative 8 %      Eosinophils Relative 0 %      Basophils Relative 1 %      Neutrophils Absolute 6 98 Thousands/µL      Immature Grans Absolute 0 05 Thousand/uL      Lymphocytes Absolute 2 29 Thousands/µL      Monocytes Absolute 0 84 Thousand/µL      Eosinophils Absolute 0 01 Thousand/µL      Basophils Absolute 0 07 Thousands/µL     Urine Microscopic [[id number]]  (Abnormal) Collected: 05/20/23 2359    Lab Status: Final result Specimen: Urine, Clean Catch Updated: 05/21/23 0016     RBC, UA 10-20 /hpf      WBC, UA None Seen /hpf      Epithelial Cells Moderate /hpf      Bacteria, UA None Seen /hpf      MUCUS THREADS None Seen    UA w Reflex to Microscopic w Reflex to Culture [[id number]]  (Abnormal) Collected: 05/20/23 2359    Lab Status: Final result Specimen: Urine, Clean Catch Updated: 05/21/23 0012     Color, UA Yellow     Clarity, UA Clear     Specific Gravity, UA 1 010     pH, UA 8 5     Leukocytes, UA Negative     Nitrite, UA Negative     Protein,  (3+) mg/dl      Glucose, UA Negative mg/dl      Ketones, UA Trace mg/dl      Urobilinogen, UA 4 0 mg/dl      Bilirubin, UA Negative     Occult Blood, UA Trace    POCT pregnancy, urine [[id number]]  (Normal) Resulted: 05/21/23 0005    Lab Status: Final result Specimen: Urine Updated: 05/21/23 0005     EXT Preg Test, Ur Negative     Control Valid                 CT abdomen pelvis with contrast   Final Result by Omari Che MD (05/21 0423)      18 x 13 mm hypodense lesion with questionable enhancement in the left lobe of the liver (2:30)  Recommend elective MRI abdomen without and with contrast for further evaluation  Borderline hyperinflation of the lungs  Recommend pulmonary function testing in the appropriate clinical setting         The study was marked in EPIC for immediate notification        Workstation performed: [id number]                    Procedures  Procedures         ED Course  ED Course as of 05/21/23 0737   [place] May 21, 2023 1845 No active vomiting, hyperkalemia improved with medications, will discharge with potassium supplement, antiemetics, careful return precautions, and does not require further admission for symptomatic treatment or potassium repletion, stable to follow-up outpatient with gastroenterology  SBIRT 20yo+    Flowsheet Row Most Recent Value   Initial Alcohol Screen: US AUDIT-C     1  How often do you have a drink containing alcohol? 0 Filed at: 05/21/2023 0242   2  How many drinks containing alcohol do you have on a typical day you are drinking? 0 Filed at: 05/21/2023 0242   3a  Male UNDER 65: How often do you have five or more drinks on one occasion? 0 Filed at: 05/21/2023 0242   3b  FEMALE Any Age, or MALE 65+: How often do you have 4 or more drinks on one occassion? 0 Filed at: 05/21/2023 0242   Audit-C Score 0 Filed at: 05/21/2023 3232   JONATHON: How many times in the past year have you    Used an illegal drug or used a prescription medication for non-medical reasons? Never Filed at: 05/21/2023 0242                    Medical Decision Making  55-year-old female with abdominal pain, nausea, vomiting, differential diagnosis includes enteritis, appendicitis, urinary tract infection, menstrual cramping, ovarian cyst, cannabinoid hyperemesis    Will obtain lab work, imaging will reevaluate    Amount and/or Complexity of Data Reviewed  Labs: ordered  Radiology: ordered  Risk  Prescription drug management            Disposition  Final diagnoses:   Nausea and vomiting   Abdominal pain   Dental infection   Liver lesion   Hypokalemia     Time reflects when diagnosis was documented in both MDM as applicable and the Disposition within this note     Time User Action Codes Description Comment    5/21/2023  1:54 AM Bryan Cure Add [R11 2] Nausea and vomiting     5/21/2023  1:54 AM Bryan Cure Add [R10 9] Abdominal pain     5/21/2023  1:54 AM Bryan Cure Add [K04 7] Dental infection 5/21/2023  4:42 AM Bryan Cure Add [K76 9] Liver lesion     5/21/2023  4:46 AM Bryan Cure Add [E87 6] Hypokalemia       ED Disposition     ED Disposition   Discharge    Condition   Stable    Date/Time   Sun May 21, 2023  6:12 AM    Comment   Juanito Ferraro discharge to home/self care  Follow-up Information     Follow up With Specialties Details Why Contact Info Additional Information     [address] Emergency Department Emergency Medicine  If symptoms worsen [address] [place], [id number]  [address] [facility] Emergency Department, [address] [place] [address] [facility]   [address]  [facility] [address] [place] [address] [address] [facility] [place] [address]     [phone]          Discharge Medication List as of 5/21/2023  6:12 AM      START taking these medications    Details   !! ondansetron (Zofran ODT) 4 mg disintegrating tablet Take 1 tablet (4 mg total) by mouth every 6 (six) hours as needed for nausea or vomiting, Starting Sun 5/21/2023, Normal      penicillin V potassium (VEETID) 500 mg tablet Take 1 tablet (500 mg total) by mouth 4 (four) times a day for 7 days, Starting Sun 5/21/2023, Until Sun 5/28/2023, Normal      potassium chloride (K-DUR,KLOR-CON) 20 mEq tablet Take 1 tablet (20 mEq total) by mouth daily for 7 days, Starting Sun 5/21/2023, Until Sun 5/28/2023, Normal       !! - Potential duplicate medications found  Please discuss with provider        CONTINUE these medications which have NOT CHANGED    Details   ibuprofen (MOTRIN) 600 mg tablet Take 1 tablet (600 mg total) by mouth every 6 (six) hours as needed for mild pain or moderate pain, Starting u 7/28/2022, Normal      Lidocaine, Anorectal, (RectiCare) 5 % CREA Apply topically 3 (three) times a day as needed (rectal pain), Starting Thu 7/28/2022, Normal      metoclopramide (Reglan) 10 mg tablet Take 1 tablet (10 mg total) by mouth every 6 (six) hours as needed (nausea and vomiting), Starting Sat 2/25/2023, Normal      !! ondansetron (ZOFRAN-ODT) 4 mg disintegrating tablet Take 1 tablet (4 mg total) by mouth every 6 (six) hours as needed for nausea or vomiting, Starting Thu 7/28/2022, Normal       !! - Potential duplicate medications found  Please discuss with provider  No discharge procedures on file      PDMP Review     None          ED Provider  Electronically Signed by           Neha Ramon DO  05/21/23 1472

## 2023-05-21 NOTE — ED NOTES
RN walked into pt room and pt is sleeping   Pt is comfortable and not in any distress      Tasneem Bishop RN  05/21/23 0757

## 2023-09-05 ENCOUNTER — TELEPHONE (OUTPATIENT)
Dept: GASTROENTEROLOGY | Facility: CLINIC | Age: 38
End: 2023-09-05

## 2023-09-05 NOTE — TELEPHONE ENCOUNTER
----- Message from Amanda Rosales sent at 9/5/2023 12:35 PM EDT -----    ----- Message -----  From: Benji Helm  Sent: 9/5/2023  11:31 AM EDT  To: #      ----- Message -----  From: Benji Helm  Sent: 9/5/2023  11:31 AM EDT  To: #      ----- Message -----  From: Aleena Weller MD  Sent: 9/5/2023  10:52 AM EDT  To: Kathey Olszewski, DO; #    Jazmin Maohney,     Would you be able to help me with Edith Bhakta? She was asking me for a reference and I thought of you! She has history of hyperemesis syndrome but stated she stopped using cannabinoids. She reports weight loss, abdominal pain and change in bowel habit over the last month. She has had weight loss and chronic GI issues for years now. 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 She is very nice. Can you please get her in soon so we can help her. Thanks again.      Raquel Millan

## 2023-09-06 ENCOUNTER — OFFICE VISIT (OUTPATIENT)
Dept: GASTROENTEROLOGY | Facility: CLINIC | Age: 38
End: 2023-09-06

## 2023-09-06 ENCOUNTER — TELEPHONE (OUTPATIENT)
Dept: GASTROENTEROLOGY | Facility: CLINIC | Age: 38
End: 2023-09-06

## 2023-09-06 VITALS
HEIGHT: 66 IN | BODY MASS INDEX: 22.98 KG/M2 | WEIGHT: 143 LBS | SYSTOLIC BLOOD PRESSURE: 100 MMHG | TEMPERATURE: 97.6 F | DIASTOLIC BLOOD PRESSURE: 60 MMHG

## 2023-09-06 DIAGNOSIS — R63.4 WEIGHT LOSS: ICD-10-CM

## 2023-09-06 DIAGNOSIS — K76.9 LIVER LESION: ICD-10-CM

## 2023-09-06 DIAGNOSIS — R11.0 NAUSEA: ICD-10-CM

## 2023-09-06 DIAGNOSIS — R10.9 ABDOMINAL PAIN, UNSPECIFIED ABDOMINAL LOCATION: Primary | ICD-10-CM

## 2023-09-06 DIAGNOSIS — R19.7 DIARRHEA, UNSPECIFIED TYPE: ICD-10-CM

## 2023-09-06 PROCEDURE — 99204 OFFICE O/P NEW MOD 45 MIN: CPT | Performed by: INTERNAL MEDICINE

## 2023-09-06 RX ORDER — DICYCLOMINE HYDROCHLORIDE 10 MG/1
10 CAPSULE ORAL 3 TIMES DAILY PRN
Qty: 30 CAPSULE | Refills: 1 | Status: SHIPPED | OUTPATIENT
Start: 2023-09-06 | End: 2023-10-06

## 2023-09-06 RX ORDER — SERTRALINE HYDROCHLORIDE 25 MG/1
25 TABLET, FILM COATED ORAL DAILY
COMMUNITY
Start: 2023-08-10 | End: 2024-08-09

## 2023-09-06 NOTE — PROGRESS NOTES
[facility] - Outpatient Consultation  Natasha Dumas 41 y.o. female MRN: [id number]  Encounter: [id number]          ASSESSMENT AND PLAN:      1. Abdominal pain, unspecified abdominal location  - EGD; Future  - dicyclomine (BENTYL) 10 mg capsule; Take 1 capsule (10 mg total) by mouth 3 (three) times a day as needed (abdominal pain)  Dispense: 30 capsule; Refill: 1  2. Nausea  3. Weight loss  - Colonoscopy; Future  - EGD; Future  4. Diarrhea, unspecified type  - Colonoscopy; Future  - Giardia Antigen with Reflex to Ova and Parasites; Future  - Calprotectin,Fecal; Future  - Giardia Antigen with Reflex to Ova and Parasites  - Calprotectin,Fecal  5. Liver lesion  - MRI abdomen w wo contrast; Future    16-year-old female presenting for evaluation of chronic and worsening GI complaints including generalized abdominal pain, weight loss, diarrhea. Given the broad nature of her symptoms and most concerning her self-reported significant weight loss, discussed appropriate work-up for this. We will plan for bidirectional endoscopy and plan for gastric, duodenal, random colonic biopsies. We will also check stool studies including Giardia and fecal calprotectin. For symptomatic relief of generalized abdominal pain, will use Bentyl as needed while awaiting endoscopic evaluation. Benefits and risk of procedure discussed in detail and she is agreeable to proceed    For liver lesions on CT from June 2023, these are likely a benign nature, however given radiology recommendation for further characterization, will plan for MRI abdomen. Follow-up in office after endoscopic evaluation  ______________________________________________________________________    HPI:  Romeo Haynes is a pleasant 16-year-old female presenting for evaluation of chronic and worsening GI complaints including generalized abdominal pain, weight loss, diarrhea.     She reports similar symptoms in 20 20-20 21 for which she was told she may have a fistula. She has not had any perianal symptoms since then. Review of notes from [facility] hospitalization reveals she had a push enteroscopy and colonoscopy. Full records of procedure reports are unavailable but based on discharge summary these were reportedly benign. She has had ER visits due to severe nausea that was felt to be secondary to cannabinoid hyperemesis syndrome. She has not use marijuana products for the past 6 weeks and fortunately nausea has resolved but she has persistence of generalized abdominal pain, diarrhea. She is mostly concerned about unexplained weight loss which is difficult for her to quantify. She reports about a 60 pound weight loss over the course of years. This same amount of 60 pounds is also noted in consultation notes in 2021. She was recently started on Zoloft 25 mg which she reports is around the time she feels her symptoms may have acutely worsened. During her recent ED visit at [facility] she had a CT abdomen pelvis which was unremarkable aside from a 1.4 x 2.2 cm region of likely focal fatty deposition in the liver as well as a 7 mm hypodense lesion in the liver dome. There was also notable sigmoid diverticulosis. REVIEW OF SYSTEMS:    CONSTITUTIONAL: Denies any fever, chills, rigors, and weight loss. HEENT: Denies odynophagia, tinnitus  CARDIOVASCULAR: No chest pain or palpitations. RESPIRATORY: Denies any cough, hemoptysis, shortness of breath or dyspnea on exertion. GASTROINTESTINAL: As noted in the History of Present Illness. GENITOURINARY: No problems with urination. Denies any hematuria or dysuria. NEUROLOGIC: No dizziness or vertigo, denies headaches. MUSCULOSKELETAL: Denies any muscle or joint pain. SKIN: Denies skin rashes or itching. ENDOCRINE:  Denies intolerance to heat or cold. PSYCHOSOCIAL: Denies depression or anxiety. Denies any recent memory loss. Historical Information   History reviewed.  No pertinent past medical history. Past Surgical History:   Procedure Laterality Date   • COLONOSCOPY       Social History   Social History     Substance and Sexual Activity   Alcohol Use Not Currently     Social History     Substance and Sexual Activity   Drug Use Yes   • Types: Marijuana     Social History     Tobacco Use   Smoking Status Former   • Packs/day: 0.25   • Types: Cigarettes   Smokeless Tobacco Never     History reviewed. No pertinent family history. Meds/Allergies       Current Outpatient Medications:   •  dicyclomine (BENTYL) 10 mg capsule  •  ibuprofen (MOTRIN) 600 mg tablet  •  sertraline (ZOLOFT) 25 mg tablet  •  Lidocaine, Anorectal, (RectiCare) 5 % CREA  •  metoclopramide (Reglan) 10 mg tablet  •  ondansetron (Zofran ODT) 4 mg disintegrating tablet  •  ondansetron (ZOFRAN-ODT) 4 mg disintegrating tablet  •  potassium chloride (K-DUR,KLOR-CON) 20 mEq tablet    Allergies   Allergen Reactions   • Tramadol GI Intolerance           Objective     Blood pressure 100/60, temperature 97.6 °F (36.4 °C), temperature source Tympanic, height 5' 6" (1.676 m), weight 64.9 kg (143 lb), unknown if currently breastfeeding. Body mass index is 23.08 kg/m². PHYSICAL EXAM:      General Appearance:   Alert, cooperative, no distress   HEENT:   Normocephalic, atraumatic, anicteric. Neck:  Supple, symmetrical, trachea midline   Lungs:   Clear to auscultation bilaterally; no rales, rhonchi or wheezing; respirations unlabored    Heart[de-identified]   Regular rate and rhythm; no murmur, rub, or gallop. Abdomen:   Soft, non-tender, non-distended; normal bowel sounds; no masses, no organomegaly    Genitalia:   Deferred    Rectal:   Deferred    Extremities:  No cyanosis, clubbing or edema    Pulses:  2+ and symmetric    Skin:  No jaundice, rashes, or lesions          Lab Results:   No visits with results within 1 Day(s) from this visit.    Latest known visit with results is:   Admission on 05/21/2023, Discharged on 05/21/2023 Component Date Value   • EXT Preg Test, Ur 05/21/2023 Negative    • Control 05/21/2023 Valid    • Color, UA 05/20/2023 Yellow    • Clarity, UA 05/20/2023 Clear    • Specific Gravity, UA 05/20/2023 1.010    • pH, UA 05/20/2023 8.5 (A)    • Leukocytes, UA 05/20/2023 Negative    • Nitrite, UA 05/20/2023 Negative    • Protein, UA 05/20/2023 300 (3+) (A)    • Glucose, UA 05/20/2023 Negative    • Ketones, UA 05/20/2023 Trace (A)    • Urobilinogen, UA 05/20/2023 4.0 (A)    • Bilirubin, UA 05/20/2023 Negative    • Occult Blood, UA 05/20/2023 Trace (A)    • RBC, UA 05/20/2023 10-20 (A)    • WBC, UA 05/20/2023 None Seen    • Epithelial Cells 05/20/2023 Moderate (A)    • Bacteria, UA 05/20/2023 None Seen    • MUCUS THREADS 05/20/2023 None Seen    • WBC 05/21/2023 10.24 (H)    • RBC 05/21/2023 4.14    • Hemoglobin 05/21/2023 13.5    • Hematocrit 05/21/2023 38.8    • MCV 05/21/2023 94    • MCH 05/21/2023 32.6    • MCHC 05/21/2023 34.8    • RDW 05/21/2023 11.5 (L)    • MPV 05/21/2023 10.0    • Platelets 25/79/4585 289    • nRBC 05/21/2023 0    • Neutrophils Relative 05/21/2023 68    • Immat GRANS % 05/21/2023 1    • Lymphocytes Relative 05/21/2023 22    • Monocytes Relative 05/21/2023 8    • Eosinophils Relative 05/21/2023 0    • Basophils Relative 05/21/2023 1    • Neutrophils Absolute 05/21/2023 6.98    • Immature Grans Absolute 05/21/2023 0.05    • Lymphocytes Absolute 05/21/2023 2.29    • Monocytes Absolute 05/21/2023 0.84    • Eosinophils Absolute 05/21/2023 0.01    • Basophils Absolute 05/21/2023 0.07    • Sodium 05/21/2023 139    • Potassium 05/21/2023 2.5 (LL)    • Chloride 05/21/2023 96    • CO2 05/21/2023 32    • ANION GAP 05/21/2023 11    • BUN 05/21/2023 11    • Creatinine 05/21/2023 0.71    • Glucose 05/21/2023 130    • Calcium 05/21/2023 9.5    • AST 05/21/2023 15    • ALT 05/21/2023 6 (L)    • Alkaline Phosphatase 05/21/2023 31 (L)    • Total Protein 05/21/2023 8.1    • Albumin 05/21/2023 4.7    • Total Bilirubin 05/21/2023 1.21 (H)    • eGFR 05/21/2023 109    • Lipase 05/21/2023 20    • Sodium 05/21/2023 138    • Potassium 05/21/2023 3.2 (L)    • Chloride 05/21/2023 101    • CO2 05/21/2023 29    • ANION GAP 05/21/2023 8    • BUN 05/21/2023 10    • Creatinine 05/21/2023 0.67    • Glucose 05/21/2023 105    • Calcium 05/21/2023 8.5    • eGFR 05/21/2023 113          Radiology Results:   US ABDOMEN COMPLETE    Result Date: 8/9/2023  Narrative: Ultrasound abdomen History: 27-year-old woman with cholecystitis expected. Abdominal pain, nausea, vomiting. Technique: Sagittal and transverse images of the upper abdomen were obtained utilizing real-time ultrasonography. Comparison: CT abdomen and pelvis 7/29/2021 Findings: Aorta/IVC: Visualized segments of the IVC and aorta are normal in caliber. Liver: The liver is mildly enlarged, with the inferior edge of the right hepatic lobe extending below the right kidney. Echotexture pattern appears homogeneous and there is no sonographically evident solid hepatic mass. A small simple appearing hepatic cyst is redemonstrated in the right lobe measuring 0.7 x 0.5 x 0.7 cm. Bile Ducts: There is no intra or extrahepatic biliary ductal dilatation. The common duct is normal in caliber, measuring 4 mm in diameter. Gallbladder: There is no cholelithiasis or sludge within the gallbladder.  No gallbladder wall thickening or pericholecystic fluid is seen. Sonographic [place] Beba sign is reported as negative. Ascites: There is no  ascites. Spleen: The spleen is top normal size measuring 11.5 cm in length. Echotexture pattern is homogeneous. Pancreas: The visualized pancreas is unremarkable, however pancreatic tail is partially obscured by overlying bowel gas. Kidneys: The right kidney measures 11.3 cm, the left kidney measures 11.0 cm in length. Both are normal in size with homogeneous echo texture pattern. There is no hydronephrosis. No sonographic evidence of renal calculus.      Impression: Impression: 1.  No acute sonographic abnormality identified. 2.  Mild hepatomegaly. 3.  No cholelithiasis or sonographic evidence of acute cholecystitis. 4.  No biliary ductal dilatation.  Workstation:[id number]

## 2023-09-06 NOTE — PATIENT INSTRUCTIONS
Scheduled date of colonoscopy/egd (as of today):10/02/2023  Physician performing colonoscopy/egd:dr Servin  Location of colonoscopy/egd: [facility]  Bowel prep reviewed with patient:Miralax  Instructions reviewed with patient by:Chiara  Clearances:  n/a

## 2023-09-06 NOTE — H&P (VIEW-ONLY)
[facility] - Outpatient Consultation  Kendall Valdes 41 y.o. female MRN: [id number]  Encounter: [id number]          ASSESSMENT AND PLAN:      1. Abdominal pain, unspecified abdominal location  - EGD; Future  - dicyclomine (BENTYL) 10 mg capsule; Take 1 capsule (10 mg total) by mouth 3 (three) times a day as needed (abdominal pain)  Dispense: 30 capsule; Refill: 1  2. Nausea  3. Weight loss  - Colonoscopy; Future  - EGD; Future  4. Diarrhea, unspecified type  - Colonoscopy; Future  - Giardia Antigen with Reflex to Ova and Parasites; Future  - Calprotectin,Fecal; Future  - Giardia Antigen with Reflex to Ova and Parasites  - Calprotectin,Fecal  5. Liver lesion  - MRI abdomen w wo contrast; Future    81-year-old female presenting for evaluation of chronic and worsening GI complaints including generalized abdominal pain, weight loss, diarrhea. Given the broad nature of her symptoms and most concerning her self-reported significant weight loss, discussed appropriate work-up for this. We will plan for bidirectional endoscopy and plan for gastric, duodenal, random colonic biopsies. We will also check stool studies including Giardia and fecal calprotectin. For symptomatic relief of generalized abdominal pain, will use Bentyl as needed while awaiting endoscopic evaluation. Benefits and risk of procedure discussed in detail and she is agreeable to proceed    For liver lesions on CT from June 2023, these are likely a benign nature, however given radiology recommendation for further characterization, will plan for MRI abdomen. Follow-up in office after endoscopic evaluation  ______________________________________________________________________    HPI:  Dee Avila is a pleasant 81-year-old female presenting for evaluation of chronic and worsening GI complaints including generalized abdominal pain, weight loss, diarrhea.     She reports similar symptoms in 20 20-20 21 for which she was told she may have a fistula. She has not had any perianal symptoms since then. Review of notes from [facility] hospitalization reveals she had a push enteroscopy and colonoscopy. Full records of procedure reports are unavailable but based on discharge summary these were reportedly benign. She has had ER visits due to severe nausea that was felt to be secondary to cannabinoid hyperemesis syndrome. She has not use marijuana products for the past 6 weeks and fortunately nausea has resolved but she has persistence of generalized abdominal pain, diarrhea. She is mostly concerned about unexplained weight loss which is difficult for her to quantify. She reports about a 60 pound weight loss over the course of years. This same amount of 60 pounds is also noted in consultation notes in 2021. She was recently started on Zoloft 25 mg which she reports is around the time she feels her symptoms may have acutely worsened. During her recent ED visit at [facility] she had a CT abdomen pelvis which was unremarkable aside from a 1.4 x 2.2 cm region of likely focal fatty deposition in the liver as well as a 7 mm hypodense lesion in the liver dome. There was also notable sigmoid diverticulosis. REVIEW OF SYSTEMS:    CONSTITUTIONAL: Denies any fever, chills, rigors, and weight loss. HEENT: Denies odynophagia, tinnitus  CARDIOVASCULAR: No chest pain or palpitations. RESPIRATORY: Denies any cough, hemoptysis, shortness of breath or dyspnea on exertion. GASTROINTESTINAL: As noted in the History of Present Illness. GENITOURINARY: No problems with urination. Denies any hematuria or dysuria. NEUROLOGIC: No dizziness or vertigo, denies headaches. MUSCULOSKELETAL: Denies any muscle or joint pain. SKIN: Denies skin rashes or itching. ENDOCRINE:  Denies intolerance to heat or cold. PSYCHOSOCIAL: Denies depression or anxiety. Denies any recent memory loss. Historical Information   History reviewed.  No pertinent past medical history. Past Surgical History:   Procedure Laterality Date   • COLONOSCOPY       Social History   Social History     Substance and Sexual Activity   Alcohol Use Not Currently     Social History     Substance and Sexual Activity   Drug Use Yes   • Types: Marijuana     Social History     Tobacco Use   Smoking Status Former   • Packs/day: 0.25   • Types: Cigarettes   Smokeless Tobacco Never     History reviewed. No pertinent family history. Meds/Allergies       Current Outpatient Medications:   •  dicyclomine (BENTYL) 10 mg capsule  •  ibuprofen (MOTRIN) 600 mg tablet  •  sertraline (ZOLOFT) 25 mg tablet  •  Lidocaine, Anorectal, (RectiCare) 5 % CREA  •  metoclopramide (Reglan) 10 mg tablet  •  ondansetron (Zofran ODT) 4 mg disintegrating tablet  •  ondansetron (ZOFRAN-ODT) 4 mg disintegrating tablet  •  potassium chloride (K-DUR,KLOR-CON) 20 mEq tablet    Allergies   Allergen Reactions   • Tramadol GI Intolerance           Objective     Blood pressure 100/60, temperature 97.6 °F (36.4 °C), temperature source Tympanic, height 5' 6" (1.676 m), weight 64.9 kg (143 lb), unknown if currently breastfeeding. Body mass index is 23.08 kg/m². PHYSICAL EXAM:      General Appearance:   Alert, cooperative, no distress   HEENT:   Normocephalic, atraumatic, anicteric. Neck:  Supple, symmetrical, trachea midline   Lungs:   Clear to auscultation bilaterally; no rales, rhonchi or wheezing; respirations unlabored    Heart[de-identified]   Regular rate and rhythm; no murmur, rub, or gallop. Abdomen:   Soft, non-tender, non-distended; normal bowel sounds; no masses, no organomegaly    Genitalia:   Deferred    Rectal:   Deferred    Extremities:  No cyanosis, clubbing or edema    Pulses:  2+ and symmetric    Skin:  No jaundice, rashes, or lesions          Lab Results:   No visits with results within 1 Day(s) from this visit.    Latest known visit with results is:   Admission on 05/21/2023, Discharged on 05/21/2023 Component Date Value   • EXT Preg Test, Ur 05/21/2023 Negative    • Control 05/21/2023 Valid    • Color, UA 05/20/2023 Yellow    • Clarity, UA 05/20/2023 Clear    • Specific Gravity, UA 05/20/2023 1.010    • pH, UA 05/20/2023 8.5 (A)    • Leukocytes, UA 05/20/2023 Negative    • Nitrite, UA 05/20/2023 Negative    • Protein, UA 05/20/2023 300 (3+) (A)    • Glucose, UA 05/20/2023 Negative    • Ketones, UA 05/20/2023 Trace (A)    • Urobilinogen, UA 05/20/2023 4.0 (A)    • Bilirubin, UA 05/20/2023 Negative    • Occult Blood, UA 05/20/2023 Trace (A)    • RBC, UA 05/20/2023 10-20 (A)    • WBC, UA 05/20/2023 None Seen    • Epithelial Cells 05/20/2023 Moderate (A)    • Bacteria, UA 05/20/2023 None Seen    • MUCUS THREADS 05/20/2023 None Seen    • WBC 05/21/2023 10.24 (H)    • RBC 05/21/2023 4.14    • Hemoglobin 05/21/2023 13.5    • Hematocrit 05/21/2023 38.8    • MCV 05/21/2023 94    • MCH 05/21/2023 32.6    • MCHC 05/21/2023 34.8    • RDW 05/21/2023 11.5 (L)    • MPV 05/21/2023 10.0    • Platelets 83/50/0167 289    • nRBC 05/21/2023 0    • Neutrophils Relative 05/21/2023 68    • Immat GRANS % 05/21/2023 1    • Lymphocytes Relative 05/21/2023 22    • Monocytes Relative 05/21/2023 8    • Eosinophils Relative 05/21/2023 0    • Basophils Relative 05/21/2023 1    • Neutrophils Absolute 05/21/2023 6.98    • Immature Grans Absolute 05/21/2023 0.05    • Lymphocytes Absolute 05/21/2023 2.29    • Monocytes Absolute 05/21/2023 0.84    • Eosinophils Absolute 05/21/2023 0.01    • Basophils Absolute 05/21/2023 0.07    • Sodium 05/21/2023 139    • Potassium 05/21/2023 2.5 (LL)    • Chloride 05/21/2023 96    • CO2 05/21/2023 32    • ANION GAP 05/21/2023 11    • BUN 05/21/2023 11    • Creatinine 05/21/2023 0.71    • Glucose 05/21/2023 130    • Calcium 05/21/2023 9.5    • AST 05/21/2023 15    • ALT 05/21/2023 6 (L)    • Alkaline Phosphatase 05/21/2023 31 (L)    • Total Protein 05/21/2023 8.1    • Albumin 05/21/2023 4.7    • Total Bilirubin 05/21/2023 1.21 (H)    • eGFR 05/21/2023 109    • Lipase 05/21/2023 20    • Sodium 05/21/2023 138    • Potassium 05/21/2023 3.2 (L)    • Chloride 05/21/2023 101    • CO2 05/21/2023 29    • ANION GAP 05/21/2023 8    • BUN 05/21/2023 10    • Creatinine 05/21/2023 0.67    • Glucose 05/21/2023 105    • Calcium 05/21/2023 8.5    • eGFR 05/21/2023 113          Radiology Results:   US ABDOMEN COMPLETE    Result Date: 8/9/2023  Narrative: Ultrasound abdomen History: 27-year-old woman with cholecystitis expected. Abdominal pain, nausea, vomiting. Technique: Sagittal and transverse images of the upper abdomen were obtained utilizing real-time ultrasonography. Comparison: CT abdomen and pelvis 7/29/2021 Findings: Aorta/IVC: Visualized segments of the IVC and aorta are normal in caliber. Liver: The liver is mildly enlarged, with the inferior edge of the right hepatic lobe extending below the right kidney. Echotexture pattern appears homogeneous and there is no sonographically evident solid hepatic mass. A small simple appearing hepatic cyst is redemonstrated in the right lobe measuring 0.7 x 0.5 x 0.7 cm. Bile Ducts: There is no intra or extrahepatic biliary ductal dilatation. The common duct is normal in caliber, measuring 4 mm in diameter. Gallbladder: There is no cholelithiasis or sludge within the gallbladder.  No gallbladder wall thickening or pericholecystic fluid is seen. Sonographic [place] Show sign is reported as negative. Ascites: There is no  ascites. Spleen: The spleen is top normal size measuring 11.5 cm in length. Echotexture pattern is homogeneous. Pancreas: The visualized pancreas is unremarkable, however pancreatic tail is partially obscured by overlying bowel gas. Kidneys: The right kidney measures 11.3 cm, the left kidney measures 11.0 cm in length. Both are normal in size with homogeneous echo texture pattern. There is no hydronephrosis. No sonographic evidence of renal calculus.      Impression: Impression: 1.  No acute sonographic abnormality identified. 2.  Mild hepatomegaly. 3.  No cholelithiasis or sonographic evidence of acute cholecystitis. 4.  No biliary ductal dilatation.  Workstation:[id number]

## 2023-09-11 ENCOUNTER — TELEPHONE (OUTPATIENT)
Age: 38
End: 2023-09-11

## 2023-09-11 NOTE — TELEPHONE ENCOUNTER
Pt called about possible allergic rxn to bentyl. Pt took bentyl 9/6 and 9/7 and started to develop a rash on face. she stopped the bentyl but then last night she had abdominal cramping so she took it again and woke up with more hives on her face and chest and SOB. Symptoms have alleviated now.     I advised pt to stop bentyl, utilize benadryl, and contact pcp or be seen at urgent care       Allergy has been added

## 2023-09-20 ENCOUNTER — TELEPHONE (OUTPATIENT)
Dept: GASTROENTEROLOGY | Facility: CLINIC | Age: 38
End: 2023-09-20

## 2023-09-20 NOTE — TELEPHONE ENCOUNTER
Cesar France RN  P Gastroenterology [address] [facility]; P Gastroenterology Pod Clerical; P Gastroenterology Pod Clinical; P Gastroenterology [facility] Clinical  Please see note below from anesthesia:     "Patient scheduled for Shahida Weems on 10/2 who appears to be pregnant (there's a note that indicates the patient did not want the pregnancy but no follow up that I can find) are you able to help confirm if she is pregnant. ..if she is, I question the urgency of her procedure and if it can wait until after she delivers"     Note they are referring to is in [address] from [facility] in August. If patient is still pregnant, she would not be able to have any procedures at [facility]. Thank you.

## 2023-09-20 NOTE — TELEPHONE ENCOUNTER
I called pt 2x , lmom asking pt to return our call to verify if patient is currently pregnant due to message below    If pt calls back please verfiy yes or no and send message to Antionette Fitzgerald RN

## 2023-10-02 ENCOUNTER — ANESTHESIA EVENT (OUTPATIENT)
Dept: GASTROENTEROLOGY | Facility: AMBULARY SURGERY CENTER | Age: 38
End: 2023-10-02

## 2023-10-02 ENCOUNTER — ANESTHESIA (OUTPATIENT)
Dept: GASTROENTEROLOGY | Facility: AMBULARY SURGERY CENTER | Age: 38
End: 2023-10-02

## 2023-10-02 ENCOUNTER — HOSPITAL ENCOUNTER (OUTPATIENT)
Dept: GASTROENTEROLOGY | Facility: AMBULARY SURGERY CENTER | Age: 38
Setting detail: OUTPATIENT SURGERY
Discharge: HOME | End: 2023-10-02
Attending: INTERNAL MEDICINE

## 2023-10-02 VITALS
RESPIRATION RATE: 16 BRPM | WEIGHT: 144 LBS | OXYGEN SATURATION: 99 % | TEMPERATURE: 98 F | HEIGHT: 66 IN | BODY MASS INDEX: 23.14 KG/M2 | DIASTOLIC BLOOD PRESSURE: 60 MMHG | SYSTOLIC BLOOD PRESSURE: 114 MMHG | HEART RATE: 64 BPM

## 2023-10-02 DIAGNOSIS — R19.7 DIARRHEA, UNSPECIFIED TYPE: ICD-10-CM

## 2023-10-02 DIAGNOSIS — R63.4 WEIGHT LOSS: ICD-10-CM

## 2023-10-02 DIAGNOSIS — R10.9 ABDOMINAL PAIN, UNSPECIFIED ABDOMINAL LOCATION: ICD-10-CM

## 2023-10-02 PROBLEM — Z72.0 TOBACCO USE: Status: ACTIVE | Noted: 2021-05-06

## 2023-10-02 PROBLEM — F41.9 ANXIETY DISORDER, UNSPECIFIED: Status: ACTIVE | Noted: 2019-02-07

## 2023-10-02 LAB
EXT PREGNANCY TEST URINE: NEGATIVE
EXT. CONTROL: NORMAL

## 2023-10-02 PROCEDURE — 88305 TISSUE EXAM BY PATHOLOGIST: CPT | Performed by: PATHOLOGY

## 2023-10-02 PROCEDURE — 81025 URINE PREGNANCY TEST: CPT | Performed by: INTERNAL MEDICINE

## 2023-10-02 PROCEDURE — 88342 IMHCHEM/IMCYTCHM 1ST ANTB: CPT | Performed by: PATHOLOGY

## 2023-10-02 RX ORDER — LIDOCAINE HYDROCHLORIDE 10 MG/ML
INJECTION, SOLUTION EPIDURAL; INFILTRATION; INTRACAUDAL; PERINEURAL AS NEEDED
Status: DISCONTINUED | OUTPATIENT
Start: 2023-10-02 | End: 2023-10-02

## 2023-10-02 RX ORDER — SODIUM CHLORIDE, SODIUM LACTATE, POTASSIUM CHLORIDE, CALCIUM CHLORIDE 600; 310; 30; 20 MG/100ML; MG/100ML; MG/100ML; MG/100ML
INJECTION, SOLUTION INTRAVENOUS CONTINUOUS PRN
Status: DISCONTINUED | OUTPATIENT
Start: 2023-10-02 | End: 2023-10-02

## 2023-10-02 RX ORDER — PROPOFOL 10 MG/ML
INJECTION, EMULSION INTRAVENOUS AS NEEDED
Status: DISCONTINUED | OUTPATIENT
Start: 2023-10-02 | End: 2023-10-02

## 2023-10-02 RX ADMIN — PROPOFOL 40 MG: 10 INJECTION, EMULSION INTRAVENOUS at 13:12

## 2023-10-02 RX ADMIN — PROPOFOL 120 MG: 10 INJECTION, EMULSION INTRAVENOUS at 13:09

## 2023-10-02 RX ADMIN — PROPOFOL 30 MG: 10 INJECTION, EMULSION INTRAVENOUS at 13:33

## 2023-10-02 RX ADMIN — PROPOFOL 50 MG: 10 INJECTION, EMULSION INTRAVENOUS at 13:14

## 2023-10-02 RX ADMIN — PROPOFOL 40 MG: 10 INJECTION, EMULSION INTRAVENOUS at 13:11

## 2023-10-02 RX ADMIN — PROPOFOL 50 MG: 10 INJECTION, EMULSION INTRAVENOUS at 13:13

## 2023-10-02 RX ADMIN — Medication 40 MG: at 13:22

## 2023-10-02 RX ADMIN — PROPOFOL 30 MG: 10 INJECTION, EMULSION INTRAVENOUS at 13:25

## 2023-10-02 RX ADMIN — PROPOFOL 30 MG: 10 INJECTION, EMULSION INTRAVENOUS at 13:18

## 2023-10-02 RX ADMIN — PROPOFOL 30 MG: 10 INJECTION, EMULSION INTRAVENOUS at 13:30

## 2023-10-02 RX ADMIN — LIDOCAINE HYDROCHLORIDE 50 MG: 10 INJECTION, SOLUTION EPIDURAL; INFILTRATION; INTRACAUDAL; PERINEURAL at 13:09

## 2023-10-02 RX ADMIN — SODIUM CHLORIDE, SODIUM LACTATE, POTASSIUM CHLORIDE, AND CALCIUM CHLORIDE: .6; .31; .03; .02 INJECTION, SOLUTION INTRAVENOUS at 13:00

## 2023-10-02 NOTE — ANESTHESIA POSTPROCEDURE EVALUATION
Post-Op Assessment Note    CV Status:  Stable  Pain Score: 0    Pain management: adequate     Mental Status:  Alert and awake   Hydration Status:  Stable   PONV Controlled:  None   Airway Patency:  Patent      Post Op Vitals Reviewed: Yes      Staff: CRNA         No notable events documented.     BP      Temp     Pulse     Resp      SpO2

## 2023-10-02 NOTE — INTERVAL H&P NOTE
H&P reviewed. After examining the patient I find no changes in the patients condition since the H&P had been written.     Vitals:    10/02/23 1245   BP: 111/63   Pulse: 68   Resp: 18   Temp: (!) 97.3 °F (36.3 °C)   SpO2: 99%

## 2023-10-02 NOTE — ANESTHESIA PREPROCEDURE EVALUATION
Procedure:  COLONOSCOPY  EGD    Relevant Problems   ANESTHESIA (within normal limits)   (-) History of anesthesia complications      CARDIO   (-) Chest pain   (-) NGUYEN (dyspnea on exertion)      NEURO/PSYCH   (+) Anxiety disorder, unspecified      PULMONARY   (-) Shortness of breath   (-) URI (upper respiratory infection)        Physical Exam    Airway    Mallampati score: II  TM Distance: >3 FB  Neck ROM: full     Dental       Cardiovascular      Pulmonary      Other Findings        Anesthesia Plan  ASA Score- 2     Anesthesia Type- IV sedation with anesthesia with ASA Monitors. Additional Monitors:   Airway Plan:           Plan Factors-Exercise tolerance (METS): >4 METS. Chart reviewed. EKG reviewed. Existing labs reviewed. Patient summary reviewed. Induction- intravenous. Postoperative Plan-     Informed Consent- Anesthetic plan and risks discussed with patient. I personally reviewed this patient with the CRNA. Discussed and agreed on the Anesthesia Plan with the CRNA. Eleanor Cruz

## 2023-10-05 PROCEDURE — 88305 TISSUE EXAM BY PATHOLOGIST: CPT | Performed by: PATHOLOGY

## 2023-10-05 PROCEDURE — 88342 IMHCHEM/IMCYTCHM 1ST ANTB: CPT | Performed by: PATHOLOGY

## 2024-01-12 ENCOUNTER — TRANSCRIBE ORDERS (OUTPATIENT)
Dept: GASTROENTEROLOGY | Facility: CLINIC | Age: 39
End: 2024-01-12

## 2024-10-16 ENCOUNTER — TELEPHONE (OUTPATIENT)
Age: 39
End: 2024-10-16

## 2024-10-16 NOTE — TELEPHONE ENCOUNTER
Patients GI provider:  ANSLEY Franz    Number to return call: [phone]    Reason for call: Pt is scheduled for an appointment  11/27/2024. Pt is selfpay pt will need letter mailed.    Scheduled procedure/appointment date if applicable: 11/27/2024

## 2024-11-27 ENCOUNTER — OFFICE VISIT (OUTPATIENT)
Dept: GASTROENTEROLOGY | Facility: CLINIC | Age: 39
End: 2024-11-27

## 2024-11-27 VITALS
BODY MASS INDEX: 22.34 KG/M2 | HEIGHT: 66 IN | SYSTOLIC BLOOD PRESSURE: 116 MMHG | TEMPERATURE: 98.2 F | DIASTOLIC BLOOD PRESSURE: 60 MMHG | WEIGHT: 139 LBS

## 2024-11-27 DIAGNOSIS — F12.90 CANNABINOID HYPEREMESIS SYNDROME: Primary | ICD-10-CM

## 2024-11-27 DIAGNOSIS — R11.2 NAUSEA AND VOMITING, UNSPECIFIED VOMITING TYPE: ICD-10-CM

## 2024-11-27 DIAGNOSIS — R17 ELEVATED BILIRUBIN: ICD-10-CM

## 2024-11-27 DIAGNOSIS — R10.13 EPIGASTRIC PAIN: ICD-10-CM

## 2024-11-27 DIAGNOSIS — R11.2 CANNABINOID HYPEREMESIS SYNDROME: Primary | ICD-10-CM

## 2024-11-27 DIAGNOSIS — K11.9 SALIVARY GLAND DISEASE: ICD-10-CM

## 2024-11-27 DIAGNOSIS — K20.90 ESOPHAGITIS: ICD-10-CM

## 2024-11-27 PROCEDURE — 99214 OFFICE O/P EST MOD 30 MIN: CPT | Performed by: PHYSICIAN ASSISTANT

## 2024-11-27 RX ORDER — SODIUM CHLORIDE, SODIUM LACTATE, POTASSIUM CHLORIDE, CALCIUM CHLORIDE 600; 310; 30; 20 MG/100ML; MG/100ML; MG/100ML; MG/100ML
125 INJECTION, SOLUTION INTRAVENOUS CONTINUOUS
Status: CANCELLED | OUTPATIENT
Start: 2024-11-27

## 2024-11-27 NOTE — H&P (VIEW-ONLY)
"Name: Mery Ferraro      : 1985      MRN: [id number]  Encounter Provider: Sara Franz PA-C  Encounter Date: 2024   Encounter department: [facility] [place]  :  Assessment & Plan  Cannabinoid hyperemesis syndrome  Pt presented ot the ED at [facility] last month for n/v, which she says re-started about 3-4 days prior to that. She says she was doing well the last few months with intermittent nausea until this re-started.  CT at the ED  noted \"Circumferential distal esophageal wall thickening, likely due to chronic reflux esophagitis.\" Lipase WNL; CBC showed WBC of 15. LFTs were WNL except for bilirubin at 2.5. Pt has been diagnosed with CHS for these symptoms in the past and notes that she did use marijuana prior to onset of this past episode, however she says she stopped right after leaving the ED. She says despite stopping marijuana use for the last month, she continues to wake up with nausea and epigastric pain. She says she has not been taking any medications for her symptoms as she would like to await further work-up. She says she vapes nicotine.   -explained I would actually recommend starting a PPI but she again defers; explained if she changes her mind, to please call our office but if EGD shows esophagitis, we would again suggest PPI at that time  -repeat LFTs + direct bili   -EGD ordered to evaluate   -anti-gerd diet  -explained it can take at least 6 weeks for THC to leave the system so I recommend she continues to avoid marijuana cessation  -vape cessation        Esophagitis  See above.   Orders:    EGD; Future    Nausea and vomiting, unspecified vomiting type  See above.   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 She says she would like to see ENT at this time.   Orders:    Ambulatory Referral to Otolaryngology; Future    Epigastric pain  See above.        Elevated bilirubin  See " above.   Orders:    Hepatic function panel; Future        History of Present Illness     HPI  Mery Ferraro is a 39 y.o. female who presents for ED follow-up. Pt presented ot the ED at [facility] last month for n/v, which she says re-started about 3-4 days prior to that. She says she was doing well the last few months with intermittent nausea until this re-started. Pt  notes that she did use marijuana prior to onset of this past episode, however she says she stopped right after leaving the ED. She says despite stopping marijuana use for the last month, she continues to wake up with nausea and epigastric pain. She says she has not been taking any medications for her symptoms as she would like to await further work-up. She says she vapes nicotine. She denies trouble swallowing,  changes in bowel habits, NSAID use, unintentional weight loss, fevers, chills, night sweats, bloody or black BM.   History obtained from: patient    Review of Systems   Constitutional:  Negative for activity change, appetite change, chills, diaphoresis, fatigue, fever and unexpected weight change.   HENT:  Negative for ear pain and sore throat.    Eyes:  Negative for pain and visual disturbance.   Respiratory:  Negative for cough and shortness of breath.    Cardiovascular:  Negative for chest pain and palpitations.   Gastrointestinal:  Positive for abdominal pain, nausea and vomiting. Negative for abdominal distention, anal bleeding, blood in stool, constipation, diarrhea and rectal pain.   Genitourinary:  Negative for dysuria and hematuria.   Musculoskeletal:  Negative for arthralgias and back pain.   Skin:  Negative for color change and rash.   Neurological:  Negative for seizures and syncope.   All other systems reviewed and are negative.    Medical History Reviewed by provider this encounter:     .  Past Medical History   History reviewed. No pertinent past medical history.  Past Surgical History:   Procedure Laterality Date    COLONOSCOPY        History reviewed. No pertinent family history.   reports that she quit smoking about 3 years ago. Her smoking use included cigarettes. She has been exposed to tobacco smoke. She has never used smokeless tobacco. She reports that she does not currently use alcohol. She reports that she does not currently use drugs after having used the following drugs: Marijuana.  Current Outpatient Medications on File Prior to Visit   Medication Sig Dispense Refill    dicyclomine (BENTYL) 10 mg capsule Take 1 capsule (10 mg total) by mouth 3 (three) times a day as needed (abdominal pain) 30 capsule 1    ibuprofen (MOTRIN) 600 mg tablet Take 1 tablet (600 mg total) by mouth every 6 (six) hours as needed for mild pain or moderate pain (Patient not taking: Reported on 11/27/2024) 30 tablet 0    Lidocaine, Anorectal, (RectiCare) 5 % CREA Apply topically 3 (three) times a day as needed (rectal pain) (Patient not taking: Reported on 9/6/2023) 113 g 0    metoclopramide (Reglan) 10 mg tablet Take 1 tablet (10 mg total) by mouth every 6 (six) hours as needed (nausea and vomiting) (Patient not taking: Reported on 9/6/2023) 12 tablet 0    ondansetron (Zofran ODT) 4 mg disintegrating tablet Take 1 tablet (4 mg total) by mouth every 6 (six) hours as needed for nausea or vomiting (Patient not taking: Reported on 9/6/2023) 20 tablet 0    ondansetron (ZOFRAN-ODT) 4 mg disintegrating tablet Take 1 tablet (4 mg total) by mouth every 6 (six) hours as needed for nausea or vomiting (Patient not taking: Reported on 9/6/2023) 20 tablet 0    potassium chloride (K-DUR,KLOR-CON) 20 mEq tablet Take 1 tablet (20 mEq total) by mouth daily for 7 days 7 tablet 0    sertraline (ZOLOFT) 25 mg tablet Take 25 mg by mouth daily       No current facility-administered medications on file prior to visit.     Allergies   Allergen Reactions    Bentyl [Dicyclomine] Hives and Shortness Of Breath    Tramadol GI Intolerance      Current Outpatient Medications on File Prior  to Visit   Medication Sig Dispense Refill    dicyclomine (BENTYL) 10 mg capsule Take 1 capsule (10 mg total) by mouth 3 (three) times a day as needed (abdominal pain) 30 capsule 1    ibuprofen (MOTRIN) 600 mg tablet Take 1 tablet (600 mg total) by mouth every 6 (six) hours as needed for mild pain or moderate pain (Patient not taking: Reported on 11/27/2024) 30 tablet 0    Lidocaine, Anorectal, (RectiCare) 5 % CREA Apply topically 3 (three) times a day as needed (rectal pain) (Patient not taking: Reported on 9/6/2023) 113 g 0    metoclopramide (Reglan) 10 mg tablet Take 1 tablet (10 mg total) by mouth every 6 (six) hours as needed (nausea and vomiting) (Patient not taking: Reported on 9/6/2023) 12 tablet 0    ondansetron (Zofran ODT) 4 mg disintegrating tablet Take 1 tablet (4 mg total) by mouth every 6 (six) hours as needed for nausea or vomiting (Patient not taking: Reported on 9/6/2023) 20 tablet 0    ondansetron (ZOFRAN-ODT) 4 mg disintegrating tablet Take 1 tablet (4 mg total) by mouth every 6 (six) hours as needed for nausea or vomiting (Patient not taking: Reported on 9/6/2023) 20 tablet 0    potassium chloride (K-DUR,KLOR-CON) 20 mEq tablet Take 1 tablet (20 mEq total) by mouth daily for 7 days 7 tablet 0    sertraline (ZOLOFT) 25 mg tablet Take 25 mg by mouth daily       No current facility-administered medications on file prior to visit.      Social History     Tobacco Use    Smoking status: Former     Current packs/day: 0.00     Types: Cigarettes     Quit date: 11/27/2021     Years since quitting: 3.0     Passive exposure: Past    Smokeless tobacco: Never    Tobacco comments:     vape   Vaping Use    Vaping status: Every Day   Substance and Sexual Activity    Alcohol use: Not Currently    Drug use: Not Currently     Types: Marijuana    Sexual activity: Not Currently     Partners: Male     Birth control/protection: None        Objective   There were no vitals taken for this visit.     Physical  Exam  Constitutional:       Appearance: Normal appearance.   Cardiovascular:      Rate and Rhythm: Normal rate and regular rhythm.   Pulmonary:      Breath sounds: Normal breath sounds.   Abdominal:      General: Bowel sounds are normal. There is no distension.      Palpations: There is no mass.      Tenderness: There is no abdominal tenderness. There is no guarding or rebound.   Neurological:      Mental Status: She is alert.         Administrative Statements   I have spent a total time of 30 minutes in caring for this patient on the day of the visit/encounter including Diagnostic results, Prognosis, Risks and benefits of tx options, Instructions for management, Patient and family education, Importance of tx compliance, Risk factor reductions, Impressions, Counseling / Coordination of care, Documenting in the medical record, Reviewing / ordering tests, medicine, procedures  , Obtaining or reviewing history  , and Communicating with other healthcare professionals .

## 2024-11-27 NOTE — PROGRESS NOTES
"Name: Mery Ferraro      : 1985      MRN: [id number]  Encounter Provider: Sara Franz PA-C  Encounter Date: 2024   Encounter department: [facility] [place]  :  Assessment & Plan  Cannabinoid hyperemesis syndrome  Pt presented ot the ED at [facility] last month for n/v, which she says re-started about 3-4 days prior to that. She says she was doing well the last few months with intermittent nausea until this re-started.  CT at the ED  noted \"Circumferential distal esophageal wall thickening, likely due to chronic reflux esophagitis.\" Lipase WNL; CBC showed WBC of 15. LFTs were WNL except for bilirubin at 2.5. Pt has been diagnosed with CHS for these symptoms in the past and notes that she did use marijuana prior to onset of this past episode, however she says she stopped right after leaving the ED. She says despite stopping marijuana use for the last month, she continues to wake up with nausea and epigastric pain. She says she has not been taking any medications for her symptoms as she would like to await further work-up. She says she vapes nicotine.   -explained I would actually recommend starting a PPI but she again defers; explained if she changes her mind, to please call our office but if EGD shows esophagitis, we would again suggest PPI at that time  -repeat LFTs + direct bili   -EGD ordered to evaluate   -anti-gerd diet  -explained it can take at least 6 weeks for THC to leave the system so I recommend she continues to avoid marijuana cessation  -vape cessation        Esophagitis  See above.   Orders:    EGD; Future    Nausea and vomiting, unspecified vomiting type  See above.   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 She says she would like to see ENT at this time.   Orders:    Ambulatory Referral to Otolaryngology; Future    Epigastric pain  See above.        Elevated bilirubin  See " above.   Orders:    Hepatic function panel; Future        History of Present Illness     HPI  Mery Ferraro is a 39 y.o. female who presents for ED follow-up. Pt presented ot the ED at [facility] last month for n/v, which she says re-started about 3-4 days prior to that. She says she was doing well the last few months with intermittent nausea until this re-started. Pt  notes that she did use marijuana prior to onset of this past episode, however she says she stopped right after leaving the ED. She says despite stopping marijuana use for the last month, she continues to wake up with nausea and epigastric pain. She says she has not been taking any medications for her symptoms as she would like to await further work-up. She says she vapes nicotine. She denies trouble swallowing,  changes in bowel habits, NSAID use, unintentional weight loss, fevers, chills, night sweats, bloody or black BM.   History obtained from: patient    Review of Systems   Constitutional:  Negative for activity change, appetite change, chills, diaphoresis, fatigue, fever and unexpected weight change.   HENT:  Negative for ear pain and sore throat.    Eyes:  Negative for pain and visual disturbance.   Respiratory:  Negative for cough and shortness of breath.    Cardiovascular:  Negative for chest pain and palpitations.   Gastrointestinal:  Positive for abdominal pain, nausea and vomiting. Negative for abdominal distention, anal bleeding, blood in stool, constipation, diarrhea and rectal pain.   Genitourinary:  Negative for dysuria and hematuria.   Musculoskeletal:  Negative for arthralgias and back pain.   Skin:  Negative for color change and rash.   Neurological:  Negative for seizures and syncope.   All other systems reviewed and are negative.    Medical History Reviewed by provider this encounter:     .  Past Medical History   History reviewed. No pertinent past medical history.  Past Surgical History:   Procedure Laterality Date    COLONOSCOPY        History reviewed. No pertinent family history.   reports that she quit smoking about 3 years ago. Her smoking use included cigarettes. She has been exposed to tobacco smoke. She has never used smokeless tobacco. She reports that she does not currently use alcohol. She reports that she does not currently use drugs after having used the following drugs: Marijuana.  Current Outpatient Medications on File Prior to Visit   Medication Sig Dispense Refill    dicyclomine (BENTYL) 10 mg capsule Take 1 capsule (10 mg total) by mouth 3 (three) times a day as needed (abdominal pain) 30 capsule 1    ibuprofen (MOTRIN) 600 mg tablet Take 1 tablet (600 mg total) by mouth every 6 (six) hours as needed for mild pain or moderate pain (Patient not taking: Reported on 11/27/2024) 30 tablet 0    Lidocaine, Anorectal, (RectiCare) 5 % CREA Apply topically 3 (three) times a day as needed (rectal pain) (Patient not taking: Reported on 9/6/2023) 113 g 0    metoclopramide (Reglan) 10 mg tablet Take 1 tablet (10 mg total) by mouth every 6 (six) hours as needed (nausea and vomiting) (Patient not taking: Reported on 9/6/2023) 12 tablet 0    ondansetron (Zofran ODT) 4 mg disintegrating tablet Take 1 tablet (4 mg total) by mouth every 6 (six) hours as needed for nausea or vomiting (Patient not taking: Reported on 9/6/2023) 20 tablet 0    ondansetron (ZOFRAN-ODT) 4 mg disintegrating tablet Take 1 tablet (4 mg total) by mouth every 6 (six) hours as needed for nausea or vomiting (Patient not taking: Reported on 9/6/2023) 20 tablet 0    potassium chloride (K-DUR,KLOR-CON) 20 mEq tablet Take 1 tablet (20 mEq total) by mouth daily for 7 days 7 tablet 0    sertraline (ZOLOFT) 25 mg tablet Take 25 mg by mouth daily       No current facility-administered medications on file prior to visit.     Allergies   Allergen Reactions    Bentyl [Dicyclomine] Hives and Shortness Of Breath    Tramadol GI Intolerance      Current Outpatient Medications on File Prior  to Visit   Medication Sig Dispense Refill    dicyclomine (BENTYL) 10 mg capsule Take 1 capsule (10 mg total) by mouth 3 (three) times a day as needed (abdominal pain) 30 capsule 1    ibuprofen (MOTRIN) 600 mg tablet Take 1 tablet (600 mg total) by mouth every 6 (six) hours as needed for mild pain or moderate pain (Patient not taking: Reported on 11/27/2024) 30 tablet 0    Lidocaine, Anorectal, (RectiCare) 5 % CREA Apply topically 3 (three) times a day as needed (rectal pain) (Patient not taking: Reported on 9/6/2023) 113 g 0    metoclopramide (Reglan) 10 mg tablet Take 1 tablet (10 mg total) by mouth every 6 (six) hours as needed (nausea and vomiting) (Patient not taking: Reported on 9/6/2023) 12 tablet 0    ondansetron (Zofran ODT) 4 mg disintegrating tablet Take 1 tablet (4 mg total) by mouth every 6 (six) hours as needed for nausea or vomiting (Patient not taking: Reported on 9/6/2023) 20 tablet 0    ondansetron (ZOFRAN-ODT) 4 mg disintegrating tablet Take 1 tablet (4 mg total) by mouth every 6 (six) hours as needed for nausea or vomiting (Patient not taking: Reported on 9/6/2023) 20 tablet 0    potassium chloride (K-DUR,KLOR-CON) 20 mEq tablet Take 1 tablet (20 mEq total) by mouth daily for 7 days 7 tablet 0    sertraline (ZOLOFT) 25 mg tablet Take 25 mg by mouth daily       No current facility-administered medications on file prior to visit.      Social History     Tobacco Use    Smoking status: Former     Current packs/day: 0.00     Types: Cigarettes     Quit date: 11/27/2021     Years since quitting: 3.0     Passive exposure: Past    Smokeless tobacco: Never    Tobacco comments:     vape   Vaping Use    Vaping status: Every Day   Substance and Sexual Activity    Alcohol use: Not Currently    Drug use: Not Currently     Types: Marijuana    Sexual activity: Not Currently     Partners: Male     Birth control/protection: None        Objective   There were no vitals taken for this visit.     Physical  Exam  Constitutional:       Appearance: Normal appearance.   Cardiovascular:      Rate and Rhythm: Normal rate and regular rhythm.   Pulmonary:      Breath sounds: Normal breath sounds.   Abdominal:      General: Bowel sounds are normal. There is no distension.      Palpations: There is no mass.      Tenderness: There is no abdominal tenderness. There is no guarding or rebound.   Neurological:      Mental Status: She is alert.         Administrative Statements   I have spent a total time of 30 minutes in caring for this patient on the day of the visit/encounter including Diagnostic results, Prognosis, Risks and benefits of tx options, Instructions for management, Patient and family education, Importance of tx compliance, Risk factor reductions, Impressions, Counseling / Coordination of care, Documenting in the medical record, Reviewing / ordering tests, medicine, procedures  , Obtaining or reviewing history  , and Communicating with other healthcare professionals .

## 2024-11-27 NOTE — PATIENT INSTRUCTIONS
Scheduled date of EGD(as of today): 12/2/24  Physician performing EGD: Dr. Servin  Location of EGD:   Instructions reviewed with patient by: Tia  Clearances: none

## 2024-12-02 ENCOUNTER — ANESTHESIA (OUTPATIENT)
Dept: GASTROENTEROLOGY | Facility: HOSPITAL | Age: 39
End: 2024-12-02

## 2024-12-02 ENCOUNTER — ANESTHESIA EVENT (OUTPATIENT)
Dept: GASTROENTEROLOGY | Facility: HOSPITAL | Age: 39
End: 2024-12-02

## 2024-12-02 ENCOUNTER — HOSPITAL ENCOUNTER (OUTPATIENT)
Dept: GASTROENTEROLOGY | Facility: HOSPITAL | Age: 39
Setting detail: OUTPATIENT SURGERY
Discharge: HOME | End: 2024-12-02

## 2024-12-02 VITALS
HEIGHT: 66 IN | TEMPERATURE: 98 F | SYSTOLIC BLOOD PRESSURE: 124 MMHG | BODY MASS INDEX: 22.34 KG/M2 | OXYGEN SATURATION: 99 % | WEIGHT: 139 LBS | RESPIRATION RATE: 18 BRPM | DIASTOLIC BLOOD PRESSURE: 73 MMHG | HEART RATE: 78 BPM

## 2024-12-02 DIAGNOSIS — K20.90 ESOPHAGITIS: ICD-10-CM

## 2024-12-02 DIAGNOSIS — R11.2 NAUSEA AND VOMITING, UNSPECIFIED VOMITING TYPE: ICD-10-CM

## 2024-12-02 LAB
EXT PREGNANCY TEST URINE: NEGATIVE
EXT. CONTROL: NORMAL

## 2024-12-02 PROCEDURE — 88305 TISSUE EXAM BY PATHOLOGIST: CPT | Performed by: PATHOLOGY

## 2024-12-02 PROCEDURE — 81025 URINE PREGNANCY TEST: CPT | Performed by: ANESTHESIOLOGY

## 2024-12-02 PROCEDURE — 43239 EGD BIOPSY SINGLE/MULTIPLE: CPT | Performed by: INTERNAL MEDICINE

## 2024-12-02 RX ORDER — SODIUM CHLORIDE, SODIUM LACTATE, POTASSIUM CHLORIDE, CALCIUM CHLORIDE 600; 310; 30; 20 MG/100ML; MG/100ML; MG/100ML; MG/100ML
INJECTION, SOLUTION INTRAVENOUS CONTINUOUS PRN
Status: DISCONTINUED | OUTPATIENT
Start: 2024-12-02 | End: 2024-12-02

## 2024-12-02 RX ORDER — SODIUM CHLORIDE, SODIUM LACTATE, POTASSIUM CHLORIDE, CALCIUM CHLORIDE 600; 310; 30; 20 MG/100ML; MG/100ML; MG/100ML; MG/100ML
125 INJECTION, SOLUTION INTRAVENOUS CONTINUOUS
Status: DISCONTINUED | OUTPATIENT
Start: 2024-12-02 | End: 2024-12-06 | Stop reason: HOSPADM

## 2024-12-02 RX ORDER — LIDOCAINE HYDROCHLORIDE 10 MG/ML
INJECTION, SOLUTION EPIDURAL; INFILTRATION; INTRACAUDAL; PERINEURAL AS NEEDED
Status: DISCONTINUED | OUTPATIENT
Start: 2024-12-02 | End: 2024-12-02

## 2024-12-02 RX ORDER — PROPOFOL 10 MG/ML
INJECTION, EMULSION INTRAVENOUS AS NEEDED
Status: DISCONTINUED | OUTPATIENT
Start: 2024-12-02 | End: 2024-12-02

## 2024-12-02 RX ADMIN — SODIUM CHLORIDE, SODIUM LACTATE, POTASSIUM CHLORIDE, AND CALCIUM CHLORIDE 125 ML/HR: .6; .31; .03; .02 INJECTION, SOLUTION INTRAVENOUS at 12:29

## 2024-12-02 RX ADMIN — PROPOFOL 100 MG: 10 INJECTION, EMULSION INTRAVENOUS at 13:03

## 2024-12-02 RX ADMIN — PROPOFOL 30 MG: 10 INJECTION, EMULSION INTRAVENOUS at 13:10

## 2024-12-02 RX ADMIN — PROPOFOL 50 MG: 10 INJECTION, EMULSION INTRAVENOUS at 13:06

## 2024-12-02 RX ADMIN — PROPOFOL 50 MG: 10 INJECTION, EMULSION INTRAVENOUS at 13:08

## 2024-12-02 RX ADMIN — SODIUM CHLORIDE, SODIUM LACTATE, POTASSIUM CHLORIDE, AND CALCIUM CHLORIDE: .6; .31; .03; .02 INJECTION, SOLUTION INTRAVENOUS at 13:00

## 2024-12-02 RX ADMIN — LIDOCAINE HYDROCHLORIDE 50 MG: 10 SOLUTION INTRAVENOUS at 13:03

## 2024-12-02 NOTE — ANESTHESIA PREPROCEDURE EVALUATION
Procedure:  EGD    Relevant Problems   NEURO/PSYCH   (+) Anxiety disorder, unspecified       Latest Reference Range & Units 12/02/24 12:18   PREGNANCY TEST URINE Negative  Negative     Physical Exam    Airway    Mallampati score: II  TM Distance: >3 FB  Neck ROM: full     Dental   No notable dental hx     Cardiovascular      Pulmonary      Other Findings  post-pubertal.      Anesthesia Plan  ASA Score- 2     Anesthesia Type- IV sedation with anesthesia with ASA Monitors.         Additional Monitors:     Airway Plan:            Plan Factors-Exercise tolerance (METS): >4 METS.    Chart reviewed.   Existing labs reviewed. Patient summary reviewed.    Patient is not a current smoker.  Patient did not smoke on day of surgery.            Induction- intravenous.    Postoperative Plan-     Perioperative Resuscitation Plan - Level 1 - Full Code.       Informed Consent- Anesthetic plan and risks discussed with patient.  I personally reviewed this patient with the CRNA. Discussed and agreed on the Anesthesia Plan with the CRNA..

## 2024-12-02 NOTE — ANESTHESIA POSTPROCEDURE EVALUATION
Post-Op Assessment Note    CV Status:  Stable    Pain management: adequate       Mental Status:  Alert and awake   Hydration Status:  Euvolemic   PONV Controlled:  Controlled   Airway Patency:  Patent     Post Op Vitals Reviewed: Yes    No anethesia notable event occurred.    Staff: CRNA           Last Filed PACU Vitals:  Vitals Value Taken Time   Temp 97 °F (36.1 °C) 12/02/24 1315   Pulse 70 12/02/24 1315   BP 97/54 12/02/24 1315   Resp 18 12/02/24 1315   SpO2 98 % 12/02/24 1315       Modified Nicolette:  Activity: 2 (12/2/2024  1:16 PM)  Respiration: 2 (12/2/2024  1:16 PM)  Circulation: 2 (12/2/2024  1:16 PM)  Consciousness: 2 (12/2/2024  1:16 PM)  Oxygen Saturation: 2 (12/2/2024  1:16 PM)  Modified Nicolette Score: 10 (12/2/2024  1:16 PM)

## 2024-12-02 NOTE — ANESTHESIA POSTPROCEDURE EVALUATION
Post-Op Assessment Note    CV Status:  Stable    Pain management: adequate       Mental Status:  Alert and awake   Hydration Status:  Euvolemic   PONV Controlled:  Controlled   Airway Patency:  Patent     Post Op Vitals Reviewed: Yes    No anethesia notable event occurred.    Staff: Anesthesiologist           Last Filed PACU Vitals:  Vitals Value Taken Time   Temp 97 °F (36.1 °C) 12/02/24 1315   Pulse 70 12/02/24 1315   BP 97/54 12/02/24 1315   Resp 18 12/02/24 1315   SpO2 98 % 12/02/24 1315       Modified Nicolette:  Activity: 2 (12/2/2024  1:16 PM)  Respiration: 2 (12/2/2024  1:16 PM)  Circulation: 2 (12/2/2024  1:16 PM)  Consciousness: 2 (12/2/2024  1:16 PM)  Oxygen Saturation: 2 (12/2/2024  1:16 PM)  Modified Nicolette Score: 10 (12/2/2024  1:16 PM)

## 2024-12-02 NOTE — INTERVAL H&P NOTE
H&P reviewed. After examining the patient I find no changes in the patients condition since the H&P had been written.    Vitals:    12/02/24 1215   BP: 106/61   Pulse: 77   Resp: 16   Temp: 97.5 °F (36.4 °C)   SpO2: 99%

## 2024-12-05 ENCOUNTER — RESULTS FOLLOW-UP (OUTPATIENT)
Dept: GASTROENTEROLOGY | Facility: CLINIC | Age: 39
End: 2024-12-05

## 2024-12-05 PROCEDURE — 88305 TISSUE EXAM BY PATHOLOGIST: CPT | Performed by: PATHOLOGY

## 2024-12-09 NOTE — TELEPHONE ENCOUNTER
----- Message from Ashly Servin DO sent at 12/5/2024  2:41 PM EST -----  Please let patient know that biopsies from her endoscopy were negative for infection or significant inflammation. Thank you!

## 2024-12-12 NOTE — TELEPHONE ENCOUNTER
Second attempt to contact patient to review  a voice message was left to give our office a call to review results ,  a letter will be mailed to patient , thank you

## 2025-01-07 ENCOUNTER — TELEPHONE (OUTPATIENT)
Dept: GASTROENTEROLOGY | Facility: CLINIC | Age: 40
End: 2025-01-07